# Patient Record
Sex: FEMALE | Race: WHITE | Employment: FULL TIME | URBAN - METROPOLITAN AREA
[De-identification: names, ages, dates, MRNs, and addresses within clinical notes are randomized per-mention and may not be internally consistent; named-entity substitution may affect disease eponyms.]

---

## 2016-01-23 LAB — T. PALLIDUM, EXTERNAL: NEGATIVE

## 2016-09-08 LAB
CHLAMYDIA, EXTERNAL: NEGATIVE
GRBS, EXTERNAL: NORMAL
HBSAG, EXTERNAL: NEGATIVE
HIV, EXTERNAL: NON REACTIVE
N. GONORRHEA, EXTERNAL: NEGATIVE
RUBELLA, EXTERNAL: NORMAL
TYPE, ABO & RH, EXTERNAL: NORMAL

## 2017-03-23 ENCOUNTER — HOSPITAL ENCOUNTER (INPATIENT)
Age: 32
LOS: 2 days | Discharge: HOME | End: 2017-03-25
Attending: OBSTETRICS & GYNECOLOGY | Admitting: OBSTETRICS & GYNECOLOGY
Payer: COMMERCIAL

## 2017-03-23 ENCOUNTER — ANESTHESIA (OUTPATIENT)
Dept: LABOR AND DELIVERY | Age: 32
End: 2017-03-23
Payer: COMMERCIAL

## 2017-03-23 ENCOUNTER — ANESTHESIA EVENT (OUTPATIENT)
Dept: LABOR AND DELIVERY | Age: 32
End: 2017-03-23
Payer: COMMERCIAL

## 2017-03-23 PROBLEM — Z37.9 NORMAL LABOR: Status: ACTIVE | Noted: 2017-03-23

## 2017-03-23 LAB
ERYTHROCYTE [DISTWIDTH] IN BLOOD BY AUTOMATED COUNT: 13.9 % (ref 11.5–14.5)
HCT VFR BLD AUTO: 37.8 % (ref 35–47)
HGB BLD-MCNC: 12.8 G/DL (ref 11.5–16)
MCH RBC QN AUTO: 30.5 PG (ref 26–34)
MCHC RBC AUTO-ENTMCNC: 33.9 G/DL (ref 30–36.5)
MCV RBC AUTO: 90.2 FL (ref 80–99)
PLATELET # BLD AUTO: 176 K/UL (ref 150–400)
RBC # BLD AUTO: 4.19 M/UL (ref 3.8–5.2)
WBC # BLD AUTO: 13.2 K/UL (ref 3.6–11)

## 2017-03-23 PROCEDURE — 74011000250 HC RX REV CODE- 250

## 2017-03-23 PROCEDURE — 74011250636 HC RX REV CODE- 250/636

## 2017-03-23 PROCEDURE — 75410000000 HC DELIVERY VAGINAL/SINGLE

## 2017-03-23 PROCEDURE — 74011250637 HC RX REV CODE- 250/637: Performed by: OBSTETRICS & GYNECOLOGY

## 2017-03-23 PROCEDURE — 74011000258 HC RX REV CODE- 258: Performed by: OBSTETRICS & GYNECOLOGY

## 2017-03-23 PROCEDURE — 75410000002 HC LABOR FEE PER 1 HR

## 2017-03-23 PROCEDURE — 77030011943

## 2017-03-23 PROCEDURE — A4300 CATH IMPL VASC ACCESS PORTAL: HCPCS

## 2017-03-23 PROCEDURE — 99282 EMERGENCY DEPT VISIT SF MDM: CPT

## 2017-03-23 PROCEDURE — 65410000002 HC RM PRIVATE OB

## 2017-03-23 PROCEDURE — 4A1HXCZ MONITORING OF PRODUCTS OF CONCEPTION, CARDIAC RATE, EXTERNAL APPROACH: ICD-10-PCS | Performed by: OBSTETRICS & GYNECOLOGY

## 2017-03-23 PROCEDURE — 77030031139 HC SUT VCRL2 J&J -A

## 2017-03-23 PROCEDURE — 85027 COMPLETE CBC AUTOMATED: CPT | Performed by: OBSTETRICS & GYNECOLOGY

## 2017-03-23 PROCEDURE — 74011250636 HC RX REV CODE- 250/636: Performed by: OBSTETRICS & GYNECOLOGY

## 2017-03-23 PROCEDURE — 0KQM0ZZ REPAIR PERINEUM MUSCLE, OPEN APPROACH: ICD-10-PCS | Performed by: OBSTETRICS & GYNECOLOGY

## 2017-03-23 PROCEDURE — 75410000003 HC RECOV DEL/VAG/CSECN EA 0.5 HR

## 2017-03-23 PROCEDURE — 76060000078 HC EPIDURAL ANESTHESIA

## 2017-03-23 PROCEDURE — 77030007880 HC KT SPN EPDRL BBMI -B

## 2017-03-23 PROCEDURE — 36415 COLL VENOUS BLD VENIPUNCTURE: CPT | Performed by: OBSTETRICS & GYNECOLOGY

## 2017-03-23 PROCEDURE — 00HU33Z INSERTION OF INFUSION DEVICE INTO SPINAL CANAL, PERCUTANEOUS APPROACH: ICD-10-PCS | Performed by: ANESTHESIOLOGY

## 2017-03-23 PROCEDURE — 74011250636 HC RX REV CODE- 250/636: Performed by: ANESTHESIOLOGY

## 2017-03-23 RX ORDER — HYDROCORTISONE ACETATE PRAMOXINE HCL 2.5; 1 G/100G; G/100G
CREAM TOPICAL AS NEEDED
Status: DISCONTINUED | OUTPATIENT
Start: 2017-03-23 | End: 2017-03-25 | Stop reason: HOSPADM

## 2017-03-23 RX ORDER — FENTANYL CITRATE 50 UG/ML
100 INJECTION, SOLUTION INTRAMUSCULAR; INTRAVENOUS ONCE
Status: DISCONTINUED | OUTPATIENT
Start: 2017-03-23 | End: 2017-03-23 | Stop reason: HOSPADM

## 2017-03-23 RX ORDER — SIMETHICONE 80 MG
80 TABLET,CHEWABLE ORAL
Status: DISCONTINUED | OUTPATIENT
Start: 2017-03-23 | End: 2017-03-25 | Stop reason: HOSPADM

## 2017-03-23 RX ORDER — DIPHENHYDRAMINE HCL 25 MG
25 CAPSULE ORAL
Status: DISCONTINUED | OUTPATIENT
Start: 2017-03-23 | End: 2017-03-25 | Stop reason: HOSPADM

## 2017-03-23 RX ORDER — ONDANSETRON 4 MG/1
4 TABLET, ORALLY DISINTEGRATING ORAL
Status: DISCONTINUED | OUTPATIENT
Start: 2017-03-23 | End: 2017-03-25 | Stop reason: HOSPADM

## 2017-03-23 RX ORDER — BUPIVACAINE HYDROCHLORIDE 5 MG/ML
INJECTION, SOLUTION EPIDURAL; INTRACAUDAL
Status: DISPENSED
Start: 2017-03-23 | End: 2017-03-23

## 2017-03-23 RX ORDER — LIDOCAINE HYDROCHLORIDE AND EPINEPHRINE 15; 5 MG/ML; UG/ML
INJECTION, SOLUTION EPIDURAL AS NEEDED
Status: DISCONTINUED | OUTPATIENT
Start: 2017-03-23 | End: 2017-03-23 | Stop reason: HOSPADM

## 2017-03-23 RX ORDER — SODIUM CHLORIDE, SODIUM LACTATE, POTASSIUM CHLORIDE, CALCIUM CHLORIDE 600; 310; 30; 20 MG/100ML; MG/100ML; MG/100ML; MG/100ML
125 INJECTION, SOLUTION INTRAVENOUS CONTINUOUS
Status: DISCONTINUED | OUTPATIENT
Start: 2017-03-23 | End: 2017-03-25 | Stop reason: HOSPADM

## 2017-03-23 RX ORDER — OXYTOCIN/RINGER'S LACTATE 20/1000 ML
125-1000 PLASTIC BAG, INJECTION (ML) INTRAVENOUS AS NEEDED
Status: DISCONTINUED | OUTPATIENT
Start: 2017-03-23 | End: 2017-03-25 | Stop reason: HOSPADM

## 2017-03-23 RX ORDER — BUPIVACAINE HYDROCHLORIDE 2.5 MG/ML
INJECTION, SOLUTION EPIDURAL; INFILTRATION; INTRACAUDAL AS NEEDED
Status: DISCONTINUED | OUTPATIENT
Start: 2017-03-23 | End: 2017-03-23 | Stop reason: HOSPADM

## 2017-03-23 RX ORDER — SODIUM CHLORIDE 0.9 % (FLUSH) 0.9 %
5-10 SYRINGE (ML) INJECTION EVERY 8 HOURS
Status: DISCONTINUED | OUTPATIENT
Start: 2017-03-23 | End: 2017-03-25 | Stop reason: HOSPADM

## 2017-03-23 RX ORDER — BUPIVACAINE HYDROCHLORIDE 5 MG/ML
30 INJECTION, SOLUTION EPIDURAL; INTRACAUDAL ONCE
Status: ACTIVE | OUTPATIENT
Start: 2017-03-23 | End: 2017-03-23

## 2017-03-23 RX ORDER — TERBUTALINE SULFATE 1 MG/ML
0.25 INJECTION SUBCUTANEOUS AS NEEDED
Status: DISCONTINUED | OUTPATIENT
Start: 2017-03-23 | End: 2017-03-23 | Stop reason: HOSPADM

## 2017-03-23 RX ORDER — BUPIVACAINE HYDROCHLORIDE 5 MG/ML
30 INJECTION, SOLUTION EPIDURAL; INTRACAUDAL AS NEEDED
Status: DISCONTINUED | OUTPATIENT
Start: 2017-03-23 | End: 2017-03-23 | Stop reason: HOSPADM

## 2017-03-23 RX ORDER — MINERAL OIL
OIL (ML) ORAL
Status: DISPENSED
Start: 2017-03-23 | End: 2017-03-23

## 2017-03-23 RX ORDER — SODIUM CHLORIDE 0.9 % (FLUSH) 0.9 %
5-10 SYRINGE (ML) INJECTION AS NEEDED
Status: DISCONTINUED | OUTPATIENT
Start: 2017-03-23 | End: 2017-03-25 | Stop reason: HOSPADM

## 2017-03-23 RX ORDER — AMMONIA 15 % (W/V)
1 AMPUL (EA) INHALATION AS NEEDED
Status: DISCONTINUED | OUTPATIENT
Start: 2017-03-23 | End: 2017-03-25 | Stop reason: HOSPADM

## 2017-03-23 RX ORDER — ZOLPIDEM TARTRATE 5 MG/1
5 TABLET ORAL
Status: DISCONTINUED | OUTPATIENT
Start: 2017-03-23 | End: 2017-03-25 | Stop reason: HOSPADM

## 2017-03-23 RX ORDER — FENTANYL CITRATE 50 UG/ML
100 INJECTION, SOLUTION INTRAMUSCULAR; INTRAVENOUS
Status: DISCONTINUED | OUTPATIENT
Start: 2017-03-23 | End: 2017-03-23 | Stop reason: HOSPADM

## 2017-03-23 RX ORDER — NALOXONE HYDROCHLORIDE 0.4 MG/ML
0.4 INJECTION, SOLUTION INTRAMUSCULAR; INTRAVENOUS; SUBCUTANEOUS AS NEEDED
Status: DISCONTINUED | OUTPATIENT
Start: 2017-03-23 | End: 2017-03-23 | Stop reason: HOSPADM

## 2017-03-23 RX ORDER — FENTANYL/BUPIVACAINE/NS/PF 2-1250MCG
PREFILLED PUMP RESERVOIR EPIDURAL
Status: COMPLETED
Start: 2017-03-23 | End: 2017-03-23

## 2017-03-23 RX ORDER — DOCUSATE SODIUM 100 MG/1
100 CAPSULE, LIQUID FILLED ORAL
Status: DISCONTINUED | OUTPATIENT
Start: 2017-03-23 | End: 2017-03-25 | Stop reason: HOSPADM

## 2017-03-23 RX ORDER — HYDROCORTISONE 1 %
CREAM (GRAM) TOPICAL AS NEEDED
Status: DISCONTINUED | OUTPATIENT
Start: 2017-03-23 | End: 2017-03-25 | Stop reason: HOSPADM

## 2017-03-23 RX ORDER — FENTANYL CITRATE 50 UG/ML
INJECTION, SOLUTION INTRAMUSCULAR; INTRAVENOUS
Status: DISPENSED
Start: 2017-03-23 | End: 2017-03-23

## 2017-03-23 RX ORDER — IBUPROFEN 400 MG/1
800 TABLET ORAL EVERY 8 HOURS
Status: DISCONTINUED | OUTPATIENT
Start: 2017-03-23 | End: 2017-03-25 | Stop reason: HOSPADM

## 2017-03-23 RX ORDER — OXYCODONE AND ACETAMINOPHEN 5; 325 MG/1; MG/1
1 TABLET ORAL
Status: DISCONTINUED | OUTPATIENT
Start: 2017-03-23 | End: 2017-03-25 | Stop reason: HOSPADM

## 2017-03-23 RX ORDER — FENTANYL CITRATE 50 UG/ML
INJECTION, SOLUTION INTRAMUSCULAR; INTRAVENOUS AS NEEDED
Status: DISCONTINUED | OUTPATIENT
Start: 2017-03-23 | End: 2017-03-23 | Stop reason: HOSPADM

## 2017-03-23 RX ORDER — OXYTOCIN IN 5 % DEXTROSE 30/500 ML
PLASTIC BAG, INJECTION (ML) INTRAVENOUS
Status: COMPLETED
Start: 2017-03-23 | End: 2017-03-23

## 2017-03-23 RX ORDER — OXYCODONE AND ACETAMINOPHEN 5; 325 MG/1; MG/1
2 TABLET ORAL
Status: DISCONTINUED | OUTPATIENT
Start: 2017-03-23 | End: 2017-03-25 | Stop reason: HOSPADM

## 2017-03-23 RX ORDER — NALBUPHINE HYDROCHLORIDE 10 MG/ML
10 INJECTION, SOLUTION INTRAMUSCULAR; INTRAVENOUS; SUBCUTANEOUS
Status: DISCONTINUED | OUTPATIENT
Start: 2017-03-23 | End: 2017-03-23 | Stop reason: HOSPADM

## 2017-03-23 RX ORDER — FENTANYL/BUPIVACAINE/NS/PF 2-1250MCG
1-16 PREFILLED PUMP RESERVOIR EPIDURAL CONTINUOUS
Status: DISCONTINUED | OUTPATIENT
Start: 2017-03-23 | End: 2017-03-23 | Stop reason: HOSPADM

## 2017-03-23 RX ADMIN — Medication 30000 MILLI-UNITS: at 04:44

## 2017-03-23 RX ADMIN — IBUPROFEN 800 MG: 400 TABLET ORAL at 22:58

## 2017-03-23 RX ADMIN — IBUPROFEN 800 MG: 400 TABLET ORAL at 15:08

## 2017-03-23 RX ADMIN — BUPIVACAINE HYDROCHLORIDE 2 ML: 2.5 INJECTION, SOLUTION EPIDURAL; INFILTRATION; INTRACAUDAL at 02:02

## 2017-03-23 RX ADMIN — SODIUM CHLORIDE, SODIUM LACTATE, POTASSIUM CHLORIDE, AND CALCIUM CHLORIDE 1000 ML: 600; 310; 30; 20 INJECTION, SOLUTION INTRAVENOUS at 01:40

## 2017-03-23 RX ADMIN — SODIUM CHLORIDE, SODIUM LACTATE, POTASSIUM CHLORIDE, AND CALCIUM CHLORIDE 125 ML/HR: 600; 310; 30; 20 INJECTION, SOLUTION INTRAVENOUS at 03:33

## 2017-03-23 RX ADMIN — FENTANYL CITRATE 100 MCG: 50 INJECTION, SOLUTION INTRAMUSCULAR; INTRAVENOUS at 01:59

## 2017-03-23 RX ADMIN — Medication 10 ML/HR: at 02:17

## 2017-03-23 RX ADMIN — AMPICILLIN SODIUM 2 G: 2 INJECTION, POWDER, FOR SOLUTION INTRAMUSCULAR; INTRAVENOUS at 02:10

## 2017-03-23 RX ADMIN — FENTANYL 0.2 MG/100ML-BUPIV 0.125%-NACL 0.9% EPIDURAL INJ 10 ML/HR: 2/0.125 SOLUTION at 02:17

## 2017-03-23 RX ADMIN — LIDOCAINE HYDROCHLORIDE AND EPINEPHRINE 3 ML: 15; 5 INJECTION, SOLUTION EPIDURAL at 01:58

## 2017-03-23 RX ADMIN — IBUPROFEN 800 MG: 400 TABLET ORAL at 07:01

## 2017-03-23 RX ADMIN — BUPIVACAINE HYDROCHLORIDE 4 ML: 2.5 INJECTION, SOLUTION EPIDURAL; INFILTRATION; INTRACAUDAL at 02:00

## 2017-03-23 NOTE — IP AVS SNAPSHOT
Current Discharge Medication List  
  
START taking these medications Dose & Instructions Dispensing Information Comments Morning Noon Evening Bedtime  
 docusate sodium 100 mg capsule Commonly known as:  [place] Harjeet Your last dose was: Your next dose is:    
   
   
 Dose:  100 mg Take 1 Cap by mouth two (2) times daily as needed for Constipation. Quantity:  60 Cap Refills:  0  
     
   
   
   
  
 ibuprofen 600 mg tablet Commonly known as:  MOTRIN Your last dose was: Your next dose is:    
   
   
 Dose:  600 mg Take 1 Tab by mouth every six (6) hours as needed for Pain. Quantity:  40 Tab Refills:  0  
     
   
   
   
  
 oxyCODONE-acetaminophen 5-325 mg per tablet Commonly known as:  PERCOCET Your last dose was: Your next dose is:    
   
   
 Dose:  1 Tab Take 1 Tab by mouth every four (4) hours as needed for Pain. Max Daily Amount: 6 Tabs. Quantity:  28 Tab Refills:  0 CONTINUE these medications which have NOT CHANGED Dose & Instructions Dispensing Information Comments Morning Noon Evening Bedtime  
 albuterol 90 mcg/actuation inhaler Commonly known as:  PROVENTIL HFA, VENTOLIN HFA, PROAIR HFA Your last dose was: Your next dose is:    
   
   
 Dose:  2 Puff Take 2 Puffs by inhalation every four (4) hours as needed for Wheezing. Quantity:  1 Inhaler Refills:  3 PRENATAL DHA+COMPLETE PRENATAL -300 mg-mcg-mg Cmpk Generic drug:  PNV no.24-iron-folic acid-dha Your last dose was: Your next dose is: Take  by mouth. Refills:  0 STOP taking these medications   
 amoxicillin 500 mg capsule Commonly known as:  AMOXIL HYDROcodone-acetaminophen 5-325 mg per tablet Commonly known as:  Alexandra Roy Iron 160 mg (50 mg iron) Tber tablet Generic drug:  ferrous sulfate ER  
   
 OCELLA 3-0.03 mg Tab Generic drug:  drospirenone-ethinyl estradiol ASK your doctor about these medications Dose & Instructions Dispensing Information Comments Morning Noon Evening Bedtime  
 predniSONE 10 mg tablet Commonly known as:  Radha Russell Your last dose was: Your next dose is:    
   
   
 Dose:  10 mg Take 1 Tab by mouth two (2) times a day. Quantity:  10 Tab Refills:  0 Where to Get Your Medications Information on where to get these meds will be given to you by the nurse or doctor. ! Ask your nurse or doctor about these medications  
  docusate sodium 100 mg capsule  
 ibuprofen 600 mg tablet  
 oxyCODONE-acetaminophen 5-325 mg per tablet

## 2017-03-23 NOTE — ROUTINE PROCESS
TRANSFER - IN REPORT:    Verbal report received from ELZA Pizarro(name) on Maame Lawrence  being received from L&D(unit) for routine progression of care      Report consisted of patients Situation, Background, Assessment and   Recommendations(SBAR). Information from the following report(s) SBAR, Procedure Summary, Intake/Output and Recent Results was reviewed with the receiving nurse. Opportunity for questions and clarification was provided. Assessment completed upon patients arrival to unit and care assumed.      5901-0178 hourly round completed

## 2017-03-23 NOTE — PROGRESS NOTES
This patient was 30 or more weeks gestation at the time of [facility] go[facility]. For complete information pertaining to this patient's pregnancy, please refer to the paper chart and ACOG form. Delivery Note    Obstetrician:  Wanda Clay MD    Assistant: none    Pre-Delivery Diagnosis:PROM : Term pregnancy or Spontaneous labor    Post-Delivery Diagnosis: Living  infant(s) or Male    Intrapartum Event: None    Procedure: Spontaneous vaginal delivery    Epidural: YES    Monitor:  Fetal Heart Tones - External and Uterine Contractions - External    Indications for instrumental delivery: none    Estimated Blood Loss:  350 cc    Episiotomy: none    Laceration(s):  2nd degree    and Bilateral periurethral     Laceration(s) repair: YES    Presentation: Cephalic    Fetal Description: oliveira    Fetal Position: Occiput Anterior    Birth Weight:     Birth Length:     Apgar - One Minute: 8    Apgar - Five Minutes: 9    Umbilical Cord: 3 vessels present    Specimens:            Complications:  none           Cord Blood Results:   Information for the patient's :  Brad Peguero [[id number]]   No results found for: PCTABR, PCTDIG, BILI, ABORH    Prenatal Labs:     Lab Results   Component Value Date/Time    ABO,Rh O Positive 2016    HBsAg, External Negative 2016    HIV, External Non Reactive 2016    Rubella, External 12.10 Immune 2016    Gonorrhea, External Negative 2016    Chlamydia, External Negative 2016    GrBStrep, External Positive - Urine 2016        Attending Attestation: I performed the procedure    Signed By:  Wanda Clay MD     2017

## 2017-03-23 NOTE — H&P
Labor and Delivery Admission Note  3/23/2017    27 y.o., , female, G1 P 0 Estimated Date of Delivery: 4/16/17 by dates and US presents with leakage of fluid and contractions at 0110  Reports good fetal movement, no bleeding, and has strong  contractions. Mentions having big gush of fluid leaking from the vagina around 11.30 pm and has been ana paula since midnight. Was 3 cm in the office on Monday    PNC: Blood type:             RH: pos            Rubella:             SVII serology: neg             GBS status: positive  Past Medical History:   Diagnosis Date    Asthma     Bronchitis     Fatigue     Knee pain, right     Thyroid nodule      No past surgical history on file.   OB/GYN: Dr. Jose Enrique Lin:   Current Facility-Administered Medications   Medication Dose Route Frequency    lactated ringers bolus infusion 500-1,000 mL  500-1,000 mL IntraVENous PRN    terbutaline (BRETHINE) injection 0.25 mg  0.25 mg SubCUTAneous PRN    nalbuphine (NUBAIN) injection 10 mg  10 mg IntraVENous Q2H PRN    fentaNYL citrate (PF) injection 100 mcg  100 mcg IntraVENous Q4H PRN    ampicillin (OMNIPEN) 2 g in 0.9% sodium chloride (MBP/ADV) 50 mL  2 g IntraVENous ONCE    ampicillin (OMNIPEN) 1 g in 0.9% sodium chloride (MBP/ADV) 50 mL  1 g IntraVENous Q4H    lactated ringers bolus infusion 1,000 mL  1,000 mL IntraVENous ONCE    lactated ringers bolus infusion 1,000 mL  1,000 mL IntraVENous ONCE    bupivacaine (PF) (MARCAINE) 0.5 % (5 mg/mL) injection 150 mg  30 mL Epidural PRN    fentaNYL citrate (PF) injection 100 mcg  100 mcg Epidural ONCE    fentaNYL 2mcg/mL - bupivacaine 0.125% pf epidural  1-16 mL/hr Epidural CONTINUOUS    naloxone (NARCAN) injection 0.4 mg  0.4 mg IntraVENous PRN    ePHEDrine (MISTOLE) 50 mg/mL injection 10 mg  10 mg IntraVENous Q5MIN PRN    bupivacaine (PF) (MARCAINE) 0.5 % (5 mg/mL) injection 150 mg  30 mL Epidural ONCE    fentaNYL citrate (PF) injection 100 mcg  100 mcg Epidural ONCE    bupivacaine (PF) (MARCAINE) 0.5 % (5 mg/mL) injection        fentaNYL citrate (PF) 50 mcg/mL injection        ePHEDrine (MISTOLE) 50 mg/mL injection         Allergies: No Known Allergies  Pertinent ROS: per HPI   Family History   Problem Relation Age of Onset    Thyroid Disease Mother     Hypertension Father      Social History     Social History    Marital status:      Spouse name: N/A    Number of children: N/A    Years of education: N/A     Occupational History    Not on file. Social History Main Topics    Smoking status: Never Smoker    Smokeless tobacco: Never Used    Alcohol use Yes      Comment: in moderation    Drug use: Not on file    Sexual activity: Not on file     Other Topics Concern    Not on file     Social History Narrative       OBJECTIVE:  Gravid , female NAD  No data recorded. Visit Vitals    Ht 5' 3\" (1.6 m)    Wt 83.5 kg (184 lb)    BMI 32.59 kg/m2       Labs:    Lab Results   Component Value Date/Time    WBC 13.2 2017 01:46 AM       Exam:  HEENT:  normal   Lungs:  clear  Cor:  RRR  Abdomen:  Fundal height 37 cm                    Soft between UC                    Clinical EFW  Fetal heart rate tracin,moderate variability,+accels  Contraction pattern: every 1-2 mins  Cervix:  7cm/90/-1 on admission  Fluid:  clear  Pelvimetry:  AP-good                      Arch- adequate                      Sidewalls- adequate                      Pelvis feels adequate for fetus.     Impression:  IUP at 36 weeks 4 days with PROM in Active Labor  GBS positive- Ampicillin IV  Epidural per patient request    Mirela Peters MD         S/P Epidural. Has had some rectal pressure  Cervix c/c/0  Labor down    Dr. Edgard Meraz

## 2017-03-23 NOTE — ANESTHESIA PROCEDURE NOTES
Epidural Block    Start time: 3/23/2017 1:51 AM  End time: 3/23/2017 2:02 AM  Performed by: Josette Sun  Authorized by: Josette Sun     Pre-Procedure  Indication: labor epidural    Preanesthetic Checklist: patient identified, risks and benefits discussed, anesthesia consent, patient being monitored, timeout performed and anesthesia consent    Timeout Time: 01:51        Epidural:   Patient position:  Left lateral decubitus  Prep region:  Lumbar  Prep: Chlorhexidine    Location:  L2-3    Needle and Epidural Catheter:   Needle Type:  Tuohy  Needle Gauge:  17 G  Injection Technique:  Loss of resistance using air  Attempts:  1  Catheter Size:  19 G  Events: no blood with aspiration, no cerebrospinal fluid with aspiration, no paresthesia and negative aspiration test    Test Dose:  Bupivacaine 0.25% and negative    Assessment:   Catheter Secured:  Tegaderm and tape  Insertion:  Uncomplicated  Patient tolerance:  Patient tolerated the procedure well with no immediate complications

## 2017-03-23 NOTE — PROGRESS NOTES
G 1 P 0 rec'd to LR # 14 @ 36.4 weeks gest to the s/o Dr Sapphire Wren with c/o LOF and ctxs since 2330. NKDA. States ctxs became very strong around 0030. States ROMs at 2330, clear fluid. Denies complic of pregnancy. +FM. GBS positive in urine from 2016, pt states she will need antibiotics. Hurting a lot and requesting an epidural now. Dr Gilberto Pereyra on-call. 0116 EFMs placed, pt unable to hold still through ctxs. Needing to get up to bathroom with each ctx.  0124 SVE: 6-7/90/-1. Verbal consent for admission / IV.  0133 c/o rectal pressure, will move to LR # 11.  0140 IV started in RLA with #20 IV catheter. LR bolus hung with pressure bag for epid placement. 5 Dr Lisa Guido present, T/O completed. 1622 Epidural placed. 0210 Feeling strong rectal pressure. 6101 Dr Gilberto Pereyra at bedside talking with patient. SVE: 10/100/0. To labor down. 2834 Started to push. 0425 Dr Gilberto Pereyra at the bedside, legs up in stirrups. 3832  of a LMI over a second degree laceration. 0700 TRANSFER - OUT REPORT:    Verbal report given to A Salima RN (name) on Mihir Pierce  being transferred to [facility], room 320 (unit) via wheelchair for routine progression of care       Report consisted of patients Situation, Background, Assessment and   Recommendations(SBAR). Information from the following report(s) SBAR, Intake/Output, MAR and Recent Results was reviewed with the receiving nurse. Lines:   Peripheral IV 17 Right; Lower Arm (Active)   Site Assessment Clean, dry, & intact 3/23/2017  2:26 AM   Phlebitis Assessment 0 3/23/2017  2:26 AM   Infiltration Assessment 0 3/23/2017  2:26 AM   Dressing Status Clean, dry, & intact 3/23/2017  2:26 AM   Dressing Type Tape;Transparent 3/23/2017  2:26 AM   Hub Color/Line Status Pink; Infusing;Patent 3/23/2017  2:26 AM        Opportunity for questions and clarification was provided.       Patient transported with:   Registered Nurse

## 2017-03-23 NOTE — LACTATION NOTE
This note was copied from a baby's chart. Late  infant was able to wake and feed at this time. His blood sugar level was 44 prior to feed. Infant placed skin to skin, and encouraged to latch in laid back, biologic position. Infant responded well to natural positioning, where innate feeding instincts are active. Baby was able to root and latch with only minimal assistance from mother. Baby fed well for 30 minutes, both breasts were offered. Reviewed effects/risks of late  birth on initiation of breastfeeding including infant's sleepiness, ineffective or missed breastfeedings, infant's decreased stamina to sustain prolonged latch and effective breastfeeding, decreased energy reserves related to low birth wt and inability to stimulate milk supply. Recommended interventions include skin to skin bonding at breast, hand expression of colostrum as infant rests at breast and initiation of breastfeeding as infant is able, initiation of pumping regimen following feedings, all EBM to be fed to baby; complement/supplement feeding if medically indicated and ordered by pediatrician. Pump set up and regimen initiated. Mother educated on use of pump, handling of milk, and cleaning.

## 2017-03-23 NOTE — LACTATION NOTE
This note was copied from a baby's chart. Made follow up lactation visit. Infant was showing feeding cues so mother responded by breastfeeding him. He took a few minutes to latch on but did eventually. He sucks rhythmically in short sucking burst and rests and begins again. Mom is good at keeping him stimulated to suck. I told her if she is having to prod him to stay awake do not let feeding go over 25 minutes. It is okay to let him go past 25 minutes if he is nursing under his own power. Mom is pumping after feedings and getting a few gtts that she is rubbing in his mouth. Mother denies further questions for me today.

## 2017-03-23 NOTE — ROUTINE PROCESS
Bedside/verbal SBAR received from Patti Sena RN.    0391-6727 hourly rounds completed    0225-5399 hourly rounds completed    2763-9906 hourly rounds completed

## 2017-03-23 NOTE — ROUTINE PROCESS
OB SBAR received from Partha Escalante RN    [id number]: hourly rounds complete  5364-6782: hourly rounds complete  5142-3139: hourly rounds complete

## 2017-03-23 NOTE — IP AVS SNAPSHOT
[address] [facility] [address] 
[phone] Patient: Denae Lozada MRN: [id number] AEU:3/0/3113 You are allergic to the following No active allergies Immunizations Administered for This Admission Name Date MMR  Deferred () Recent Documentation Height Weight Breastfeeding? BMI OB Status Smoking Status 1.6 m 83.5 kg Unknown 32.59 kg/m2 Recent pregnancy Never Smoker Unresulted Labs Order Current Status SAMPLE TO BLOOD BANK In process Emergency Contacts Name Discharge Info Relation Home Work Mobile Monroe Randle  Other Relative [6]   [phone] Phillip Gonzalez  Spouse [3]   [phone] About your hospitalization You were admitted on:  March 23, 2017 You last received care in the:  [address] [facility] You were discharged on:  March 25, 2017 Unit phone number:  [phone] Why you were hospitalized Your primary diagnosis was:  Not on File Your diagnoses also included:  Normal Labor Providers Seen During Your Hospitalizations Provider Role Specialty Primary office phone Arturo Reis MD Attending Provider Obstetrics & Gynecology [phone] Your Primary Care Physician (PCP) Primary Care Physician Office Phone Office Fax Tanisha Mock [phone] Follow-up Information Follow up With Details Comments Contact Info [facility] Call As needed for breastfeeding questions or concerns. [address], [address] 
[phone] Thuan Jhaveri MD   [address] [address] [address] 
[phone] Arturo Reis MD In 6 weeks Postpartum [address] [facility],[address] 
[phone] Current Discharge Medication List  
  
START taking these medications Dose & Instructions Dispensing Information Comments Morning Noon Evening Bedtime  
 docusate sodium 100 mg capsule Commonly known as:  Edwin Kt Your last dose was: Your next dose is:    
   
   
 Dose:  100 mg Take 1 Cap by mouth two (2) times daily as needed for Constipation. Quantity:  60 Cap Refills:  0  
     
   
   
   
  
 ibuprofen 600 mg tablet Commonly known as:  MOTRIN Your last dose was: Your next dose is:    
   
   
 Dose:  600 mg Take 1 Tab by mouth every six (6) hours as needed for Pain. Quantity:  40 Tab Refills:  0  
     
   
   
   
  
 oxyCODONE-acetaminophen 5-325 mg per tablet Commonly known as:  PERCOCET Your last dose was: Your next dose is:    
   
   
 Dose:  1 Tab Take 1 Tab by mouth every four (4) hours as needed for Pain. Max Daily Amount: 6 Tabs. Quantity:  28 Tab Refills:  0 CONTINUE these medications which have NOT CHANGED Dose & Instructions Dispensing Information Comments Morning Noon Evening Bedtime  
 albuterol 90 mcg/actuation inhaler Commonly known as:  PROVENTIL HFA, VENTOLIN HFA, PROAIR HFA Your last dose was: Your next dose is:    
   
   
 Dose:  2 Puff Take 2 Puffs by inhalation every four (4) hours as needed for Wheezing. Quantity:  1 Inhaler Refills:  3 PRENATAL DHA+COMPLETE PRENATAL -300 mg-mcg-mg Cmpk Generic drug:  PNV no.24-iron-folic acid-dha Your last dose was: Your next dose is: Take  by mouth. Refills:  0 STOP taking these medications   
 amoxicillin 500 mg capsule Commonly known as:  AMOXIL HYDROcodone-acetaminophen 5-325 mg per tablet Commonly known as:  Javy Ngozi Iron 160 mg (50 mg iron) Tber tablet Generic drug:  ferrous sulfate ER  
   
  
 OCELLA 3-0.03 mg Tab Generic drug:  drospirenone-ethinyl estradiol ASK your doctor about these medications Dose & Instructions Dispensing Information Comments Morning Noon Evening Bedtime  
 predniSONE 10 mg tablet Commonly known as:  Maeve Edmundks Your last dose was: Your next dose is:    
   
   
 Dose:  10 mg Take 1 Tab by mouth two (2) times a day. Quantity:  10 Tab Refills:  0 Where to Get Your Medications Information on where to get these meds will be given to you by the nurse or doctor. ! Ask your nurse or doctor about these medications  
  docusate sodium 100 mg capsule  
 ibuprofen 600 mg tablet  
 oxyCODONE-acetaminophen 5-325 mg per tablet Discharge Instructions Vaginal Childbirth: Care Instructions Your Care Instructions Your body will slowly heal in the next few weeks. It is easy to get too tired and overwhelmed during the first weeks after your baby is born. Changes in your hormones can shift your mood without warning. You may find it hard to meet the extra demands on your energy and time. Take it easy on yourself. Follow-up care is a key part of your treatment and safety. Be sure to make and go to all appointments, and call your doctor if you are having problems. It's also a good idea to know your test results and keep a list of the medicines you take. How can you care for yourself at home? · Vaginal bleeding and cramps ¨ After delivery, you will have a bloody discharge from the vagina. This will turn pink within a week and then white or yellow after about 10 days. It may last for 2 to 4 weeks or longer, until the uterus has healed. Use pads instead of tampons until you stop bleeding. ¨ Do not worry if you pass some blood clots, as long as they are smaller than a golf ball. If you have a tear or stitches in your vaginal area, change the pad at least every 4 hours to prevent soreness and infection. ¨ You may have cramps for the first few days after childbirth.  These are normal and occur as the uterus shrinks to normal size. Take an over-the-counter pain medicine, such as acetaminophen (Tylenol), ibuprofen (Advil, Motrin), or naproxen (Aleve), for cramps. Read and follow all instructions on the label. Do not take aspirin, because it can cause more bleeding. ¨ Do not take two or more pain medicines at the same time unless the doctor told you to. Many pain medicines have acetaminophen, which is Tylenol. Too much acetaminophen (Tylenol) can be harmful. · Stitches ¨ If you have stitches, they will dissolve on their own and do not need to be removed. Follow your doctor's instructions for cleaning the stitched area. ¨ Put ice or a cold pack on your painful area for 10 to 20 minutes at a time, several times a day, for the first few days. Put a thin cloth between the ice and your skin. ¨ Sit in a few inches of warm water (sitz bath) 3 times a day and after bowel movements. The warm water helps with pain and itching. If you do not have a tub, a warm shower might help. · Breast fullness ¨ Your breasts may overfill (engorge) in the first few days after delivery. To help milk flow and to relieve pain, warm your breasts in the shower or by using warm, moist towels before nursing. ¨ If you are not nursing, do not put warmth on your breasts or touch your breasts. Wear a tight bra or sports bra and use ice until the fullness goes away. This usually takes 2 to 3 days. ¨ Put ice or a cold pack on your breast after nursing to reduce swelling and pain. Put a thin cloth between the ice and your skin. · Activity ¨ Eat a balanced diet. Do not try to lose weight by cutting calories. Keep taking your prenatal vitamins, or take a multivitamin. ¨ Get as much rest as you can. Try to take naps when your baby sleeps during the day. ¨ Get some exercise every day. But do not do any heavy exercise until your doctor says it is okay. ¨ Wait until you are healed (about 4 to 6 weeks) before you have sexual intercourse. Your doctor will tell you when it is okay to have sex. ¨ Talk to your doctor about birth control. You can get pregnant even before your period returns. Also, you can get pregnant while you are breastfeeding. · Mental health ¨ It is normal to have some sadness, anxiety, sleeplessness, and mood swings after you go home. If you feel upset or hopeless for more than a few days or are having trouble doing the things you need to do, talk to your doctor. · Constipation and hemorrhoids ¨ Drink plenty of fluids, enough so that your urine is light yellow or clear like water. If you have kidney, heart, or liver disease and have to limit fluids, talk with your doctor before you increase the amount of fluids you drink. ¨ Eat plenty of fiber each day. Have a bran muffin or bran cereal for breakfast, and try eating a piece of fruit for a mid-afternoon snack. ¨ For painful, itchy hemorrhoids, put ice or a cold pack on the area several times a day for 10 minutes at a time. Follow this by putting a warm compress on the area for another 10 to 20 minutes or by sitting in a shallow, warm bath. When should you call for help? Call 911 anytime you think you may need emergency care. For example, call if: 
· You are thinking of hurting yourself, your baby, or anyone else. · You have sudden, severe pain in your belly. · You passed out (lost consciousness). Call your doctor now or seek immediate medical care if: 
· You have severe vaginal bleeding. · You are soaking through a pad each hour for 2 or more hours. · Your vaginal bleeding seems to be getting heavier or is still bright red 4 days after delivery. · You are dizzy or lightheaded, or you feel like you may faint. · You are vomiting or cannot keep fluids down. · You have a fever. · You have new or more belly pain. · You pass tissue (not just blood). · Your vaginal discharge smells bad. · Your belly feels tender or full and hard. · Your breasts are continuously painful or red. · You feel sad, anxious, or hopeless for more than a few days. Watch closely for changes in your health, and be sure to contact your doctor if you have any problems. Where can you learn more? Go to http://jorge-alex.info/. Enter U109 in the search box to learn more about \"Vaginal Childbirth: Care Instructions. \" Current as of: May 30, 2016 Content Version: 11.1 © 6110-1891 [employer]. Care instructions adapted under license by [employer] (which disclaims liability or warranty for this information). If you have questions about a medical condition or this instruction, always ask your healthcare professional. [address] any warranty or liability for your use of this information. Discharge Orders None Outski Announcement We are excited to announce that we are making your provider's discharge notes available to you in Outski. You will see these notes when they are completed and signed by the physician that discharged you from your recent hospital stay. If you have any questions or concerns about any information you see in Outski, please call the Health Information Department where you were seen or reach out to your Primary Care Provider for more information about your plan of care. Introducing [facility]! [employer] introduces Outski patient portal. Now you can access parts of your medical record, email your doctor's office, and request medication refills online. 1. In your internet browser, go to [email or web address] 2. Click on the First Time User? Click Here link in the Sign In box. You will see the New Member Sign Up page. 3. Enter your Outski Access Code exactly as it appears below. You will not need to use this code after youve completed the sign-up process.  If you do not sign up before the expiration date, you must request a new code. · Synthorx Access Code: [email or web address] Expires: 6/23/2017 10:27 AM 
 
4. Enter the last four digits of your Social Security Number (xxxx) and Date of Birth (mm/dd/yyyy) as indicated and click Submit. You will be taken to the next sign-up page. 5. Create a Synthorx ID. This will be your Synthorx login ID and cannot be changed, so think of one that is secure and easy to remember. 6. Create a Synthorx password. You can change your password at any time. 7. Enter your Password Reset Question and Answer. This can be used at a later time if you forget your password. 8. Enter your e-mail address. You will receive e-mail notification when new information is available in [address] 9. Click Sign Up. You can now view and download portions of your medical record. 10. Click the Download Summary menu link to download a portable copy of your medical information. If you have questions, please visit the Frequently Asked Questions section of the Synthorx website. Remember, Synthorx is NOT to be used for urgent needs. For medical emergencies, dial 911. Now available from your iPhone and Android! General Information Please provide this summary of care documentation to your next provider. Patient Signature:  ____________________________________________________________ Date:  ____________________________________________________________  
  
Héctormisha Viera Provider Signature:  ____________________________________________________________ Date:  ____________________________________________________________

## 2017-03-24 PROCEDURE — 74011250637 HC RX REV CODE- 250/637: Performed by: OBSTETRICS & GYNECOLOGY

## 2017-03-24 PROCEDURE — 65410000002 HC RM PRIVATE OB

## 2017-03-24 RX ADMIN — IBUPROFEN 800 MG: 400 TABLET ORAL at 22:58

## 2017-03-24 RX ADMIN — IBUPROFEN 800 MG: 400 TABLET ORAL at 15:00

## 2017-03-24 RX ADMIN — DOCUSATE SODIUM 100 MG: 100 CAPSULE, LIQUID FILLED ORAL at 16:39

## 2017-03-24 RX ADMIN — IBUPROFEN 800 MG: 400 TABLET ORAL at 07:00

## 2017-03-24 NOTE — PROGRESS NOTES
Post-Partum Day Number 1 Progress Note    Fred Gonzalez     Assessment: Doing well, post partum day 1, afebrile    Plan:  1. Continue routine postpartum and perineal care as well as maternal education. 2. N/A     Information for the patient's :  Mauricio Metcalf [[id number]]   Vaginal, Spontaneous Delivery   Patient doing well without significant complaint. Voiding without difficulty, normal lochia. Vitals:  Visit Vitals    /70 (BP 1 Location: Right arm, BP Patient Position: At rest)    Pulse 83    Temp 98.3 °F (36.8 °C)    Resp 16    Ht 5' 3\" (1.6 m)    Wt 83.5 kg (184 lb)    SpO2 98%    Breastfeeding Unknown    BMI 32.59 kg/m2     Temp (24hrs), Av.1 °F (36.7 °C), Min:98 °F (36.7 °C), Max:98.3 °F (36.8 °C)        Exam:   Patient without distress. Abdomen soft, fundus firm, nontender                Perineum with normal lochia noted. Lower extremities are negative for swelling, cords or tenderness. Labs:     Lab Results   Component Value Date/Time    WBC 13.2 2017 01:46 AM    WBC 7.4 2012 11:40 AM    HGB 12.8 2017 01:46 AM    HGB 11.7 2012 11:40 AM    HCT 37.8 2017 01:46 AM    HCT 35.4 2012 11:40 AM    PLATELET 975 [id number] 01:46 AM    PLATELET 093  11:40 AM       No results found for this or any previous visit (from the past 24 hour(s)).

## 2017-03-24 NOTE — LACTATION NOTE
This note was copied from a baby's chart. I did not see the baby at the breast. Mom states the baby is latching well. At the last feeding her nursed for 55 minutes. Baby was born late  so mom has been pumping after nursing. She is getting drops of colostrum and she knows to give the baby any colostrum she pumps. Reviewed effects/risks of late  delivery on initiation of breast feeding including infant's sleepiness, ineffective or missed breast feedings, infant's decreased stamina to sustain prolonged latch and effective breast feeding, decreased energy reserves related to low birth weight and inability to stimulate milk supply.  Recommended interventions include skin to skin, feeding on cues and pumping as needed to ensure adequate milk supply.

## 2017-03-24 NOTE — ROUTINE PROCESS
Bedside/verbal SBAR received from Thad Goodman RN.    [id number] hourly rounds completed    [id number] hourly rounds completed    [id number] hourly rounds completed

## 2017-03-25 VITALS
DIASTOLIC BLOOD PRESSURE: 80 MMHG | HEIGHT: 63 IN | TEMPERATURE: 98.3 F | SYSTOLIC BLOOD PRESSURE: 125 MMHG | HEART RATE: 88 BPM | RESPIRATION RATE: 16 BRPM | WEIGHT: 184 LBS | OXYGEN SATURATION: 98 % | BODY MASS INDEX: 32.6 KG/M2

## 2017-03-25 PROCEDURE — 74011250637 HC RX REV CODE- 250/637: Performed by: OBSTETRICS & GYNECOLOGY

## 2017-03-25 RX ORDER — IBUPROFEN 600 MG/1
600 TABLET ORAL
Qty: 40 TAB | Refills: 0 | Status: SHIPPED | OUTPATIENT
Start: 2017-03-25 | End: 2018-08-23

## 2017-03-25 RX ORDER — OXYCODONE AND ACETAMINOPHEN 5; 325 MG/1; MG/1
1 TABLET ORAL
Qty: 28 TAB | Refills: 0 | Status: SHIPPED | OUTPATIENT
Start: 2017-03-25 | End: 2018-08-23

## 2017-03-25 RX ORDER — DOCUSATE SODIUM 100 MG/1
100 CAPSULE, LIQUID FILLED ORAL
Qty: 60 CAP | Refills: 0 | Status: SHIPPED | OUTPATIENT
Start: 2017-03-25 | End: 2018-08-23

## 2017-03-25 RX ADMIN — IBUPROFEN 800 MG: 400 TABLET ORAL at 08:00

## 2017-03-25 NOTE — DISCHARGE SUMMARY
Obstetrical Discharge Summary     Name: Tess Donald MRN: [id number]  SSN: [id number]    YOB: 1985  Age: 27 y.o. Sex: female      Admit Date: 3/23/2017    Discharge Date: 3/25/2017     Admitting Physician: Abigail Walker MD     Attending Physician:  Rolf Flaherty MD     Admission Diagnoses: maternity  Normal labor    Discharge Diagnoses:   Information for the patient's :  Don Sahu [[id number]]   Delivery of a 2.815 kg male infant via Vaginal, Spontaneous Delivery on 3/23/2017 at 4:38 AM  by . Apgars were 8 and 8. Additional Diagnoses:   Hospital Problems  Date Reviewed: 2016          Codes Class Noted POA    Normal labor ICD-10-CM: O80, Z37.9  ICD-9-CM: 108  3/23/2017 Unknown             Lab Results   Component Value Date/Time    Rubella, External 12.10 Immune 2016    GrBStrep, External Positive - Urine 2016       Hospital Course: Normal hospital course following the delivery. Disposition at Discharge: Home or self care    Discharged Condition: Stable    Patient Instructions:   Current Discharge Medication List      START taking these medications    Details   ibuprofen (MOTRIN) 600 mg tablet Take 1 Tab by mouth every six (6) hours as needed for Pain. Qty: 40 Tab, Refills: 0      oxyCODONE-acetaminophen (PERCOCET) 5-325 mg per tablet Take 1 Tab by mouth every four (4) hours as needed for Pain. Max Daily Amount: 6 Tabs. Qty: 28 Tab, Refills: 0      docusate sodium (COLACE) 100 mg capsule Take 1 Cap by mouth two (2) times daily as needed for Constipation. Qty: 60 Cap, Refills: 0         CONTINUE these medications which have NOT CHANGED    Details   PNV no.24-iron-folic acid-dha (PRENATAL DHA+COMPLETE PRENATAL) -300 mg-mcg-mg cmpk Take  by mouth. albuterol (PROVENTIL HFA, VENTOLIN HFA, PROAIR HFA) 90 mcg/actuation inhaler Take 2 Puffs by inhalation every four (4) hours as needed for Wheezing.   Qty: 1 Inhaler, Refills: 3         STOP taking these medications       ferrous sulfate ER (IRON) 160 mg (50 mg iron) TbER tablet Comments:   Reason for Stopping:         amoxicillin (AMOXIL) 500 mg capsule Comments:   Reason for Stopping:         predniSONE (DELTASONE) 10 mg tablet Comments:   Reason for Stopping:         HYDROcodone-acetaminophen (NORCO) 5-325 mg per tablet Comments:   Reason for Stopping:         drospirenone-ethinyl estradiol (OCELLA) 3-0.03 mg per tablet Comments:   Reason for Stopping:               Reference my discharge instructions.     Follow-up Appointments   Procedures    FOLLOW UP VISIT Appointment in: 6 Weeks     Standing Status:   Standing     Number of Occurrences:   1     Order Specific Question:   Appointment in     Answer:   6 Weeks        Signed By:  King Cotto MD     March 25, 2017

## 2017-03-25 NOTE — DISCHARGE INSTRUCTIONS
Vaginal Childbirth: Care Instructions  Your Care Instructions  Your body will slowly heal in the next few weeks. It is easy to get too tired and overwhelmed during the first weeks after your baby is born. Changes in your hormones can shift your mood without warning. You may find it hard to meet the extra demands on your energy and time. Take it easy on yourself. Follow-up care is a key part of your treatment and safety. Be sure to make and go to all appointments, and call your doctor if you are having problems. It's also a good idea to know your test results and keep a list of the medicines you take. How can you care for yourself at home? · Vaginal bleeding and cramps  ¨ After delivery, you will have a bloody discharge from the vagina. This will turn pink within a week and then white or yellow after about 10 days. It may last for 2 to 4 weeks or longer, until the uterus has healed. Use pads instead of tampons until you stop bleeding. ¨ Do not worry if you pass some blood clots, as long as they are smaller than a golf ball. If you have a tear or stitches in your vaginal area, change the pad at least every 4 hours to prevent soreness and infection. ¨ You may have cramps for the first few days after childbirth. These are normal and occur as the uterus shrinks to normal size. Take an over-the-counter pain medicine, such as acetaminophen (Tylenol), ibuprofen (Advil, Motrin), or naproxen (Aleve), for cramps. Read and follow all instructions on the label. Do not take aspirin, because it can cause more bleeding. ¨ Do not take two or more pain medicines at the same time unless the doctor told you to. Many pain medicines have acetaminophen, which is Tylenol. Too much acetaminophen (Tylenol) can be harmful. · Stitches  ¨ If you have stitches, they will dissolve on their own and do not need to be removed. Follow your doctor's instructions for cleaning the stitched area.   ¨ Put ice or a cold pack on your painful area for 10 to 20 minutes at a time, several times a day, for the first few days. Put a thin cloth between the ice and your skin. ¨ Sit in a few inches of warm water (sitz bath) 3 times a day and after bowel movements. The warm water helps with pain and itching. If you do not have a tub, a warm shower might help. · Breast fullness  ¨ Your breasts may overfill (engorge) in the first few days after delivery. To help milk flow and to relieve pain, warm your breasts in the shower or by using warm, moist towels before nursing. ¨ If you are not nursing, do not put warmth on your breasts or touch your breasts. Wear a tight bra or sports bra and use ice until the fullness goes away. This usually takes 2 to 3 days. ¨ Put ice or a cold pack on your breast after nursing to reduce swelling and pain. Put a thin cloth between the ice and your skin. · Activity  ¨ Eat a balanced diet. Do not try to lose weight by cutting calories. Keep taking your prenatal vitamins, or take a multivitamin. ¨ Get as much rest as you can. Try to take naps when your baby sleeps during the day. ¨ Get some exercise every day. But do not do any heavy exercise until your doctor says it is okay. ¨ Wait until you are healed (about 4 to 6 weeks) before you have sexual intercourse. Your doctor will tell you when it is okay to have sex. ¨ Talk to your doctor about birth control. You can get pregnant even before your period returns. Also, you can get pregnant while you are breastfeeding. · Mental health  ¨ It is normal to have some sadness, anxiety, sleeplessness, and mood swings after you go home. If you feel upset or hopeless for more than a few days or are having trouble doing the things you need to do, talk to your doctor. · Constipation and hemorrhoids  ¨ Drink plenty of fluids, enough so that your urine is light yellow or clear like water.  If you have kidney, heart, or liver disease and have to limit fluids, talk with your doctor before you increase the amount of fluids you drink. ¨ Eat plenty of fiber each day. Have a bran muffin or bran cereal for breakfast, and try eating a piece of fruit for a mid-afternoon snack. ¨ For painful, itchy hemorrhoids, put ice or a cold pack on the area several times a day for 10 minutes at a time. Follow this by putting a warm compress on the area for another 10 to 20 minutes or by sitting in a shallow, warm bath. When should you call for help? Call 911 anytime you think you may need emergency care. For example, call if:  · You are thinking of hurting yourself, your baby, or anyone else. · You have sudden, severe pain in your belly. · You passed out (lost consciousness). Call your doctor now or seek immediate medical care if:  · You have severe vaginal bleeding. · You are soaking through a pad each hour for 2 or more hours. · Your vaginal bleeding seems to be getting heavier or is still bright red 4 days after delivery. · You are dizzy or lightheaded, or you feel like you may faint. · You are vomiting or cannot keep fluids down. · You have a fever. · You have new or more belly pain. · You pass tissue (not just blood). · Your vaginal discharge smells bad. · Your belly feels tender or full and hard. · Your breasts are continuously painful or red. · You feel sad, anxious, or hopeless for more than a few days. Watch closely for changes in your health, and be sure to contact your doctor if you have any problems. Where can you learn more? Go to http://jorge-alex.info/. Enter M999 in the search box to learn more about \"Vaginal Childbirth: Care Instructions. \"  Current as of: May 30, 2016  Content Version: 11.1  © 9864-4999 [employer]. Care instructions adapted under license by [employer] (which disclaims liability or warranty for this information).  If you have questions about a medical condition or this instruction, always ask your healthcare professional. [employer] disclaims any warranty or liability for your use of this information.

## 2017-03-25 NOTE — LACTATION NOTE
This note was copied from a baby's chart. Mom and baby scheduled for discharge this am. I did not see the baby at the breast. Mom states baby is nursing well and has improved throughout post partum stay, deep latch maintained, mother is comfortable, milk is in transition, baby feeding vigorously with rhythmic suck, swallow, breathe pattern, with audible swallowing, and evident milk transfer, both breasts offered, baby is asleep following feeding. Baby is feeding on demand, voiding and stools present as appropriate over the last 24 hours. Mom has been pumping after nursing and offering the EBM to the baby because baby was born at 42 weeks. Reviewed cluster feeding. The brief behavioral phase preceeding milk transition is called cluster feeding. Typical  behavior: baby becomes vigorous at the breast and wants to feed frequently- every 1-2 hours for several feedings. Emptying of the breast twice produces double in subsequent feedings. This is the normal process by which the baby demands his/her supply. This type of frequent feeding is the stimulation which causes lactogenesis II (milk coming in). Discussed engorgement. Breasts may become engorged when milk \"comes in\". How milk is made / normal phases of milk production, supply and demand discussed. Taught care of engorged breasts - frequent breastfeeding encouraged, warm compresses and breast massage ac. Then nurse the baby or pump. Apply cold compresses pc x 15 minutes a few times a day for swelling or discomfort. May need to do this care for a couple of days. Teaching completed and all questions answered. Feeding log updated and given to mom.

## 2017-03-25 NOTE — ROUTINE PROCESS
0730: OB SBAR report received by Lord Destini PAPPAS.   8526: Discharge instructions reviewed with pt. Prescriptions provided. All questions answered. Follow up with Dr. Mike Fields in 6 weeks. Pt discharged in wheelchair by volunteers. Hourly rounds completed [id number].

## 2017-03-25 NOTE — PROGRESS NOTES
Post-Partum Day Number 2 Progress Note    Fred Gonzalez     Assessment: Doing well, post partum day 2    Plan:   1. Discharge home today  2. Follow up in office in 6 weeks with Mary Grijalva MD  3. Post partum activity advised, diet as tolerated  4. Discharge Medications: ibuprofen, percocet and medications prior to admission    Information for the patient's :  Jam Walls [[id number]]   Vaginal, Spontaneous Delivery   Patient doing well without significant complaint. Voiding without difficulty, normal lochia. Vitals:  Visit Vitals    /80 (BP 1 Location: Right arm, BP Patient Position: At rest;Sitting)    Pulse 88    Temp 98.3 °F (36.8 °C)    Resp 16    Ht 5' 3\" (1.6 m)    Wt 83.5 kg (184 lb)    SpO2 98%    Breastfeeding Unknown    BMI 32.59 kg/m2     Temp (24hrs), Av.3 °F (36.8 °C), Min:97.8 °F (36.6 °C), Max:98.5 °F (36.9 °C)      Exam:         Patient without distress. Abdomen soft, fundus firm, nontender                 Lower extremities are negative for swelling, cords or tenderness. Labs:     Lab Results   Component Value Date/Time    WBC 13.2 2017 01:46 AM    WBC 7.4 2012 11:40 AM    HGB 12.8 2017 01:46 AM    HGB 11.7 2012 11:40 AM    HCT 37.8 2017 01:46 AM    HCT 35.4 2012 11:40 AM    PLATELET 203  01:46 AM    PLATELET 307  11:40 AM       No results found for this or any previous visit (from the past 24 hour(s)).

## 2017-03-25 NOTE — PROGRESS NOTES
Post-Partum Day Number 1 Progress Note    Fred Gonzalez     Assessment: Doing well, post partum day 1    Plan:  1. Continue routine postpartum and perineal care as well as maternal education. 2. The risks and benefits of the circumcision  procedure and anesthesia including: bleeding, infection, variability of cosmetic results were discussed at length with the mother. She is aware that future repeat procedures may be necessary. She gives informed consent to proceed as noted and her questions are answered. 3. Discharge in AM    Information for the patient's :  Brad Peguero [[id number]]   Vaginal, Spontaneous Delivery   Patient doing well without significant complaint. Voiding without difficulty, normal lochia. Vitals:  Visit Vitals    /81 (BP 1 Location: Left arm, BP Patient Position: Post activity)    Pulse 85    Temp 98.4 °F (36.9 °C)    Resp 16    Ht 5' 3\" (1.6 m)    Wt 83.5 kg (184 lb)    SpO2 98%    Breastfeeding Unknown    BMI 32.59 kg/m2     Temp (24hrs), Av.4 °F (36.9 °C), Min:98.3 °F (36.8 °C), Max:98.5 °F (36.9 °C)        Exam:   Patient without distress. Abdomen soft, fundus firm, nontender                Perineum with normal lochia noted. Lower extremities are negative for swelling, cords or tenderness. Labs:     Lab Results   Component Value Date/Time    WBC 13.2 2017 01:46 AM    WBC 7.4 2012 11:40 AM    HGB 12.8 2017 01:46 AM    HGB 11.7 2012 11:40 AM    HCT 37.8 2017 01:46 AM    HCT 35.4 2012 11:40 AM    PLATELET 318  01:46 AM    PLATELET 691  11:40 AM       No results found for this or any previous visit (from the past 24 hour(s)).

## 2017-03-25 NOTE — ROUTINE PROCESS
Bedside and Verbal shift change report given to CORTEZ Kumari RN (oncoming nurse) by Cecile Amezcua RN (offgoing nurse). Report included the following information SBAR, Kardex, Procedure Summary, Intake/Output, MAR and Med Rec Status. 9204-9813: Hourly rounds completed. 0218-1152: Hourly rounds completed. 9807-0471: Hourly rounds completed.

## 2017-08-02 PROBLEM — E05.90 HYPERTHYROIDISM: Status: ACTIVE | Noted: 2017-08-02

## 2017-08-09 ENCOUNTER — HOSPITAL ENCOUNTER (OUTPATIENT)
Dept: ULTRASOUND IMAGING | Age: 32
Discharge: HOME | End: 2017-08-09
Attending: INTERNAL MEDICINE
Payer: COMMERCIAL

## 2017-08-09 DIAGNOSIS — E04.1 THYROID NODULE: ICD-10-CM

## 2017-08-09 PROCEDURE — 76536 US EXAM OF HEAD AND NECK: CPT

## 2018-08-29 ENCOUNTER — HOSPITAL ENCOUNTER (OUTPATIENT)
Dept: ULTRASOUND IMAGING | Age: 33
Discharge: HOME | End: 2018-08-29
Attending: INTERNAL MEDICINE
Payer: COMMERCIAL

## 2018-08-29 DIAGNOSIS — E04.1 THYROID NODULE: ICD-10-CM

## 2018-08-29 PROCEDURE — 76536 US EXAM OF HEAD AND NECK: CPT

## 2019-08-27 ENCOUNTER — HOSPITAL ENCOUNTER (OUTPATIENT)
Dept: ULTRASOUND IMAGING | Age: 34
Discharge: HOME | End: 2019-08-27
Attending: INTERNAL MEDICINE
Payer: COMMERCIAL

## 2019-08-27 DIAGNOSIS — E04.1 THYROID NODULE: ICD-10-CM

## 2019-08-27 PROCEDURE — 76536 US EXAM OF HEAD AND NECK: CPT

## 2020-12-08 ENCOUNTER — OFFICE VISIT (OUTPATIENT)
Dept: URGENT CARE | Age: 35
End: 2020-12-08
Payer: COMMERCIAL

## 2020-12-08 VITALS — OXYGEN SATURATION: 98 % | TEMPERATURE: 100.8 F | HEART RATE: 108 BPM | RESPIRATION RATE: 16 BRPM

## 2020-12-08 DIAGNOSIS — L03.119 CELLULITIS OF AXILLARY REGION: Primary | ICD-10-CM

## 2020-12-08 PROCEDURE — 99203 OFFICE O/P NEW LOW 30 MIN: CPT | Performed by: NURSE PRACTITIONER

## 2020-12-08 RX ORDER — CLINDAMYCIN HYDROCHLORIDE 300 MG/1
300 CAPSULE ORAL 3 TIMES DAILY
Qty: 21 CAP | Refills: 0 | Status: SHIPPED | OUTPATIENT
Start: 2020-12-08 | End: 2020-12-15

## 2020-12-08 NOTE — PROGRESS NOTES
Patient presents to drive up respiratory clinic during covid-19 pandemic and was evaluated in her vehicle. Here for evaluation of painful rash under both arms. States shaved arms 1 night ago and noted some immediate irritation. Following morning seemed worse. Initially burning sensation now more painful. This afternoon she reports subjective fever. Denies any chest pain, dizziness, n/v, headache, chest pain, breast changes, sore throat or exposures/bites. She has a virtual visit with [facility] tomorrow as one of her friends was concerned it may be related to her breast. No personal history of breast CA. No noted masses or breast changes. ROS negative for rigors  PMH significant for hyperthyroidism+ thyroid nodule.   No covid exposures                 Past Medical History:   Diagnosis Date    Asthma     Asthmatic Bronchitis,     Bronchitis     Fatigue     Hyperthyroidism 8/2/2017    Knee pain, right     Thyroid nodule 2012    Nodule outside of thyroid, being watched        Past Surgical History:   Procedure Laterality Date    HX OTHER SURGICAL  2012    R knee surgery    HX OTHER SURGICAL  2010    Ovarian cyst         Family History   Problem Relation Age of Onset    Thyroid Disease Mother     Hypertension Father     No Known Problems Brother     No Known Problems Brother         Social History     Socioeconomic History    Marital status:      Spouse name: Not on file    Number of children: Not on file    Years of education: Not on file    Highest education level: Not on file   Occupational History    Not on file   Social Needs    Financial resource strain: Not on file    Food insecurity     Worry: Not on file     Inability: Not on file    Transportation needs     Medical: Not on file     Non-medical: Not on file   Tobacco Use    Smoking status: Never Smoker    Smokeless tobacco: Never Used   Substance and Sexual Activity    Alcohol use: No    Drug use: No    Sexual activity: Yes Partners: Male     Birth control/protection: None   Lifestyle    Physical activity     Days per week: Not on file     Minutes per session: Not on file    Stress: Not on file   Relationships    Social connections     Talks on phone: Not on file     Gets together: Not on file     Attends Holiness service: Not on file     Active member of club or organization: Not on file     Attends meetings of clubs or organizations: Not on file     Relationship status: Not on file    Intimate partner violence     Fear of current or ex partner: Not on file     Emotionally abused: Not on file     Physically abused: Not on file     Forced sexual activity: Not on file   Other Topics Concern    Not on file   Social History Narrative    Not on file                ALLERGIES: Patient has no known allergies. Review of Systems   Gastrointestinal: Negative for vomiting. Musculoskeletal: Negative for arthralgias, neck pain and neck stiffness. Skin: Positive for rash. All other systems reviewed and are negative. Vitals:    12/08/20 1700 12/08/20 1724   Pulse: (!) 118 (!) 108   Resp: 18 16   Temp: (!) 100.8 °F (38.2 °C)    SpO2: 98%        Physical Exam  Constitutional:       Appearance: Normal appearance. Comments: Pleasant mood. HENT:      Nose: No congestion or rhinorrhea. Mouth/Throat:      Mouth: Mucous membranes are moist.      Pharynx: No oropharyngeal exudate. Eyes:      Extraocular Movements: Extraocular movements intact. Conjunctiva/sclera: Conjunctivae normal.      Pupils: Pupils are equal, round, and reactive to light. Neck:      Musculoskeletal: No neck rigidity. Comments: No supraclavicular or epitroclear LAD. No noted lymphadenopathy of axilla. Cardiovascular:      Rate and Rhythm: Normal rate and regular rhythm. Pulses: Normal pulses. Heart sounds: Normal heart sounds. Pulmonary:      Effort: Pulmonary effort is normal. No respiratory distress.       Breath sounds: Normal breath sounds. No stridor. No wheezing, rhonchi or rales. Lymphadenopathy:      Cervical: No cervical adenopathy. Skin:     Capillary Refill: Capillary refill takes less than 2 seconds. Neurological:      Mental Status: She is alert and oriented to person, place, and time. Psychiatric:         Mood and Affect: Mood normal.         Behavior: Behavior normal.         MDM     Differential Diagnosis; Clinical Impression; Plan:     CLINICAL IMPRESSION:  (L03.119) Cellulitis of axillary region  (primary encounter diagnosis)    Orders Placed This Encounter      clindamycin (CLEOCIN) 300 mg capsule          Sig: Take 1 Cap by mouth three (3) times daily for 7 days. Dispense:  21 Cap          Refill:  0      Plan:  Start clindamycin for suspected cellulitis. ddx developing abscess. [place] area with pen at home to note swelling   ED for fevers not lowered by tylenol, dizziness, n/v/d, near synope or SOB or rapidly spreading rash. Otherwise keep virtual visit for tomorrow with OBGYN. We have reviewed concerning signs/symptoms, normal vs abnormal progression of medical condition and when to seek immediate medical attention. Schedule with PCP or Urgent Care immediately for worsening or new symptoms. See your PCP if there is not at least some improvement in symptoms within the next 48 hours  You should see your PCP for updates on your routine health maintenance.              Procedures

## 2022-01-25 LAB
ANTIBODY SCREEN, EXTERNAL: NEGATIVE
HBSAG, EXTERNAL: NEGATIVE
HIV, EXTERNAL: NON REACTIVE
RUBELLA, EXTERNAL: 11.5
T. PALLIDUM, EXTERNAL: NON REACTIVE

## 2022-03-19 PROBLEM — E05.90 HYPERTHYROIDISM: Status: ACTIVE | Noted: 2017-08-02

## 2022-03-19 PROBLEM — Z37.9 NORMAL LABOR: Status: ACTIVE | Noted: 2017-03-23

## 2022-08-02 ENCOUNTER — HOSPITAL ENCOUNTER (INPATIENT)
Age: 37
LOS: 3 days | Discharge: HOME | End: 2022-08-05
Attending: OBSTETRICS & GYNECOLOGY | Admitting: OBSTETRICS & GYNECOLOGY
Payer: COMMERCIAL

## 2022-08-02 PROBLEM — O14.90 PRE-ECLAMPSIA: Status: ACTIVE | Noted: 2022-08-02

## 2022-08-02 PROBLEM — O36.5990 IUGR (INTRAUTERINE GROWTH RESTRICTION) AFFECTING CARE OF MOTHER: Status: ACTIVE | Noted: 2022-08-02

## 2022-08-02 LAB
ALBUMIN SERPL-MCNC: 2.3 G/DL (ref 3.5–5)
ALBUMIN/GLOB SERPL: 0.7 {RATIO} (ref 1.1–2.2)
ALP SERPL-CCNC: 129 U/L (ref 45–117)
ALT SERPL-CCNC: 19 U/L (ref 12–78)
ANION GAP SERPL CALC-SCNC: 8 MMOL/L (ref 5–15)
AST SERPL-CCNC: 15 U/L (ref 15–37)
BILIRUB SERPL-MCNC: 0.2 MG/DL (ref 0.2–1)
BUN SERPL-MCNC: 20 MG/DL (ref 6–20)
BUN/CREAT SERPL: 31 (ref 12–20)
CALCIUM SERPL-MCNC: 9.1 MG/DL (ref 8.5–10.1)
CHLORIDE SERPL-SCNC: 104 MMOL/L (ref 97–108)
CO2 SERPL-SCNC: 23 MMOL/L (ref 21–32)
CREAT SERPL-MCNC: 0.65 MG/DL (ref 0.55–1.02)
CREAT UR-MCNC: 52 MG/DL
ERYTHROCYTE [DISTWIDTH] IN BLOOD BY AUTOMATED COUNT: 14.6 % (ref 11.5–14.5)
GLOBULIN SER CALC-MCNC: 3.4 G/DL (ref 2–4)
GLUCOSE SERPL-MCNC: 85 MG/DL (ref 65–100)
HCT VFR BLD AUTO: 36.3 % (ref 35–47)
HGB BLD-MCNC: 12.2 G/DL (ref 11.5–16)
LDH SERPL L TO P-CCNC: 224 U/L (ref 81–246)
MCH RBC QN AUTO: 29.3 PG (ref 26–34)
MCHC RBC AUTO-ENTMCNC: 33.6 G/DL (ref 30–36.5)
MCV RBC AUTO: 87.1 FL (ref 80–99)
NRBC # BLD: 0 K/UL (ref 0–0.01)
NRBC BLD-RTO: 0 PER 100 WBC
PLATELET # BLD AUTO: 177 K/UL (ref 150–400)
PMV BLD AUTO: 12 FL (ref 8.9–12.9)
POTASSIUM SERPL-SCNC: 4.1 MMOL/L (ref 3.5–5.1)
PROT SERPL-MCNC: 5.7 G/DL (ref 6.4–8.2)
PROT UR-MCNC: 148 MG/DL (ref 0–11.9)
PROT/CREAT UR-RTO: 2.8
RBC # BLD AUTO: 4.17 M/UL (ref 3.8–5.2)
SODIUM SERPL-SCNC: 135 MMOL/L (ref 136–145)
URATE SERPL-MCNC: 4.5 MG/DL (ref 2.6–6)
WBC # BLD AUTO: 7.5 K/UL (ref 3.6–11)

## 2022-08-02 PROCEDURE — 87081 CULTURE SCREEN ONLY: CPT

## 2022-08-02 PROCEDURE — 83615 LACTATE (LD) (LDH) ENZYME: CPT

## 2022-08-02 PROCEDURE — 65270000029 HC RM PRIVATE

## 2022-08-02 PROCEDURE — 74011000250 HC RX REV CODE- 250: Performed by: OBSTETRICS & GYNECOLOGY

## 2022-08-02 PROCEDURE — 74011250637 HC RX REV CODE- 250/637: Performed by: OBSTETRICS & GYNECOLOGY

## 2022-08-02 PROCEDURE — 3E033VJ INTRODUCTION OF OTHER HORMONE INTO PERIPHERAL VEIN, PERCUTANEOUS APPROACH: ICD-10-PCS | Performed by: OBSTETRICS & GYNECOLOGY

## 2022-08-02 PROCEDURE — 80053 COMPREHEN METABOLIC PANEL: CPT

## 2022-08-02 PROCEDURE — 74011000258 HC RX REV CODE- 258: Performed by: OBSTETRICS & GYNECOLOGY

## 2022-08-02 PROCEDURE — 84156 ASSAY OF PROTEIN URINE: CPT

## 2022-08-02 PROCEDURE — 84550 ASSAY OF BLOOD/URIC ACID: CPT

## 2022-08-02 PROCEDURE — G0378 HOSPITAL OBSERVATION PER HR: HCPCS

## 2022-08-02 PROCEDURE — 36415 COLL VENOUS BLD VENIPUNCTURE: CPT

## 2022-08-02 PROCEDURE — 74011250636 HC RX REV CODE- 250/636: Performed by: OBSTETRICS & GYNECOLOGY

## 2022-08-02 PROCEDURE — 85027 COMPLETE CBC AUTOMATED: CPT

## 2022-08-02 PROCEDURE — 74011000250 HC RX REV CODE- 250

## 2022-08-02 RX ORDER — BETAMETHASONE SODIUM PHOSPHATE AND BETAMETHASONE ACETATE 3; 3 MG/ML; MG/ML
12 INJECTION, SUSPENSION INTRA-ARTICULAR; INTRALESIONAL; INTRAMUSCULAR; SOFT TISSUE ONCE
Status: COMPLETED | OUTPATIENT
Start: 2022-08-03 | End: 2022-08-03

## 2022-08-02 RX ORDER — LABETALOL HYDROCHLORIDE 5 MG/ML
20 INJECTION, SOLUTION INTRAVENOUS
Status: COMPLETED | OUTPATIENT
Start: 2022-08-02 | End: 2022-08-02

## 2022-08-02 RX ORDER — OXYTOCIN/RINGER'S LACTATE 30/500 ML
87.3 PLASTIC BAG, INJECTION (ML) INTRAVENOUS AS NEEDED
Status: DISCONTINUED | OUTPATIENT
Start: 2022-08-02 | End: 2022-08-05 | Stop reason: HOSPADM

## 2022-08-02 RX ORDER — SODIUM CHLORIDE 0.9 % (FLUSH) 0.9 %
5-40 SYRINGE (ML) INJECTION EVERY 8 HOURS
Status: DISCONTINUED | OUTPATIENT
Start: 2022-08-02 | End: 2022-08-05 | Stop reason: HOSPADM

## 2022-08-02 RX ORDER — SODIUM CHLORIDE, SODIUM LACTATE, POTASSIUM CHLORIDE, CALCIUM CHLORIDE 600; 310; 30; 20 MG/100ML; MG/100ML; MG/100ML; MG/100ML
125 INJECTION, SOLUTION INTRAVENOUS CONTINUOUS
Status: DISCONTINUED | OUTPATIENT
Start: 2022-08-02 | End: 2022-08-05 | Stop reason: HOSPADM

## 2022-08-02 RX ORDER — MAGNESIUM SULFATE HEPTAHYDRATE 40 MG/ML
4 INJECTION, SOLUTION INTRAVENOUS ONCE
Status: COMPLETED | OUTPATIENT
Start: 2022-08-02 | End: 2022-08-02

## 2022-08-02 RX ORDER — NALOXONE HYDROCHLORIDE 0.4 MG/ML
0.4 INJECTION, SOLUTION INTRAMUSCULAR; INTRAVENOUS; SUBCUTANEOUS AS NEEDED
Status: DISCONTINUED | OUTPATIENT
Start: 2022-08-02 | End: 2022-08-03 | Stop reason: HOSPADM

## 2022-08-02 RX ORDER — OXYTOCIN/RINGER'S LACTATE 30/500 ML
10 PLASTIC BAG, INJECTION (ML) INTRAVENOUS AS NEEDED
Status: COMPLETED | OUTPATIENT
Start: 2022-08-02 | End: 2022-08-03

## 2022-08-02 RX ORDER — BETAMETHASONE SODIUM PHOSPHATE AND BETAMETHASONE ACETATE 3; 3 MG/ML; MG/ML
12 INJECTION, SUSPENSION INTRA-ARTICULAR; INTRALESIONAL; INTRAMUSCULAR; SOFT TISSUE ONCE
Status: COMPLETED | OUTPATIENT
Start: 2022-08-02 | End: 2022-08-02

## 2022-08-02 RX ORDER — ONDANSETRON 2 MG/ML
4 INJECTION INTRAMUSCULAR; INTRAVENOUS
Status: DISCONTINUED | OUTPATIENT
Start: 2022-08-02 | End: 2022-08-03 | Stop reason: HOSPADM

## 2022-08-02 RX ORDER — BETAMETHASONE SODIUM PHOSPHATE AND BETAMETHASONE ACETATE 3; 3 MG/ML; MG/ML
12 INJECTION, SUSPENSION INTRA-ARTICULAR; INTRALESIONAL; INTRAMUSCULAR; SOFT TISSUE ONCE
Status: CANCELLED | OUTPATIENT
Start: 2022-08-02 | End: 2022-08-02

## 2022-08-02 RX ORDER — LABETALOL HYDROCHLORIDE 5 MG/ML
INJECTION, SOLUTION INTRAVENOUS
Status: COMPLETED
Start: 2022-08-02 | End: 2022-08-02

## 2022-08-02 RX ORDER — LABETALOL 200 MG/1
200 TABLET, FILM COATED ORAL EVERY 12 HOURS
Status: DISCONTINUED | OUTPATIENT
Start: 2022-08-02 | End: 2022-08-05 | Stop reason: HOSPADM

## 2022-08-02 RX ORDER — OXYTOCIN/RINGER'S LACTATE 30/500 ML
1-25 PLASTIC BAG, INJECTION (ML) INTRAVENOUS
Status: DISCONTINUED | OUTPATIENT
Start: 2022-08-02 | End: 2022-08-03 | Stop reason: HOSPADM

## 2022-08-02 RX ORDER — NALBUPHINE HYDROCHLORIDE 20 MG/ML
5 INJECTION, SOLUTION INTRAMUSCULAR; INTRAVENOUS; SUBCUTANEOUS
Status: DISCONTINUED | OUTPATIENT
Start: 2022-08-02 | End: 2022-08-03 | Stop reason: HOSPADM

## 2022-08-02 RX ORDER — SODIUM CHLORIDE 0.9 % (FLUSH) 0.9 %
5-40 SYRINGE (ML) INJECTION AS NEEDED
Status: DISCONTINUED | OUTPATIENT
Start: 2022-08-02 | End: 2022-08-03

## 2022-08-02 RX ORDER — LABETALOL HYDROCHLORIDE 5 MG/ML
40 INJECTION, SOLUTION INTRAVENOUS
Status: COMPLETED | OUTPATIENT
Start: 2022-08-02 | End: 2022-08-02

## 2022-08-02 RX ADMIN — SODIUM CHLORIDE 5 MILLION UNITS: 900 INJECTION INTRAVENOUS at 21:21

## 2022-08-02 RX ADMIN — LABETALOL HYDROCHLORIDE 20 MG: 5 INJECTION, SOLUTION INTRAVENOUS at 14:49

## 2022-08-02 RX ADMIN — LABETALOL HYDROCHLORIDE 200 MG: 200 TABLET, FILM COATED ORAL at 17:43

## 2022-08-02 RX ADMIN — OXYTOCIN 1 MILLI-UNITS/MIN: 10 INJECTION, SOLUTION INTRAMUSCULAR; INTRAVENOUS at 22:04

## 2022-08-02 RX ADMIN — MAGNESIUM SULFATE HEPTAHYDRATE 4 G: 40 INJECTION, SOLUTION INTRAVENOUS at 21:18

## 2022-08-02 RX ADMIN — SODIUM CHLORIDE, POTASSIUM CHLORIDE, SODIUM LACTATE AND CALCIUM CHLORIDE 125 ML/HR: 600; 310; 30; 20 INJECTION, SOLUTION INTRAVENOUS at 14:45

## 2022-08-02 RX ADMIN — MAGNESIUM SULFATE HEPTAHYDRATE 2 G/HR: 500 INJECTION, SOLUTION INTRAMUSCULAR; INTRAVENOUS at 21:38

## 2022-08-02 RX ADMIN — LABETALOL HYDROCHLORIDE 40 MG: 5 INJECTION, SOLUTION INTRAVENOUS at 15:02

## 2022-08-02 RX ADMIN — BETAMETHASONE SODIUM PHOSPHATE AND BETAMETHASONE ACETATE 12 MG: 3; 3 INJECTION, SUSPENSION INTRA-ARTICULAR; INTRALESIONAL; INTRAMUSCULAR at 15:42

## 2022-08-02 NOTE — PROGRESS NOTES
8/2/2022 12:43 PM Received client to LDR#8 from [address] [place] [address] office for evaluation of elevated BPs. Client states that she has had some elevated pressure for the last 2 weeks and the office has been monitoring it but today it was 180s/100s. Client states that she was scheduled for an induction on this upcoming Monday. 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 Client denies any contractions or leakage of fluid of pain/discomfort. GBS sample obtained in office will send down to lab. 1435-Dr. Bethany Pérez informed of recent BPs, orders given to start labetalol protocol and administer steroids. 1502-CLient unaware of contractions at this time  1630-Client in bed working on her laptop, no distress noted   2116-Magnesium Sulfate started per MD orders. Infusion explained to  and client and opportunity for questions given. All questions answered. 2135-Dr. Malik at bedside talking with client and  and opportunity for questions given regarding POC for the night. All questions answered and client verbalized understanding. 2200-Pitocin started   2340-Bedside and Verbal shift change report given to ELZA Peter RN (oncoming nurse) by MADAN NEGRON  (offgoing nurse). Report included the following information SBAR, Kardex, MAR, Accordion, and Recent Results.

## 2022-08-03 ENCOUNTER — ANESTHESIA EVENT (OUTPATIENT)
Dept: LABOR AND DELIVERY | Age: 37
End: 2022-08-03
Payer: COMMERCIAL

## 2022-08-03 ENCOUNTER — ANESTHESIA (OUTPATIENT)
Dept: LABOR AND DELIVERY | Age: 37
End: 2022-08-03
Payer: COMMERCIAL

## 2022-08-03 PROCEDURE — 75410000003 HC RECOV DEL/VAG/CSECN EA 0.5 HR

## 2022-08-03 PROCEDURE — 74011250637 HC RX REV CODE- 250/637: Performed by: OBSTETRICS & GYNECOLOGY

## 2022-08-03 PROCEDURE — 65410000002 HC RM PRIVATE OB

## 2022-08-03 PROCEDURE — 75410000002 HC LABOR FEE PER 1 HR

## 2022-08-03 PROCEDURE — 74011250636 HC RX REV CODE- 250/636: Performed by: OBSTETRICS & GYNECOLOGY

## 2022-08-03 PROCEDURE — 75410000000 HC DELIVERY VAGINAL/SINGLE

## 2022-08-03 PROCEDURE — 74011000250 HC RX REV CODE- 250: Performed by: ANESTHESIOLOGY

## 2022-08-03 PROCEDURE — 76060000078 HC EPIDURAL ANESTHESIA

## 2022-08-03 PROCEDURE — 00HU33Z INSERTION OF INFUSION DEVICE INTO SPINAL CANAL, PERCUTANEOUS APPROACH: ICD-10-PCS | Performed by: ANESTHESIOLOGY

## 2022-08-03 PROCEDURE — 74011000258 HC RX REV CODE- 258: Performed by: OBSTETRICS & GYNECOLOGY

## 2022-08-03 PROCEDURE — 77030014125 HC TY EPDRL BBMI -B: Performed by: ANESTHESIOLOGY

## 2022-08-03 PROCEDURE — 88307 TISSUE EXAM BY PATHOLOGIST: CPT

## 2022-08-03 PROCEDURE — 74011250636 HC RX REV CODE- 250/636: Performed by: ANESTHESIOLOGY

## 2022-08-03 RX ORDER — ACETAMINOPHEN 325 MG/1
650 TABLET ORAL
Status: DISCONTINUED | OUTPATIENT
Start: 2022-08-03 | End: 2022-08-03 | Stop reason: SDUPTHER

## 2022-08-03 RX ORDER — HYDROCORTISONE 1 %
CREAM (GRAM) TOPICAL AS NEEDED
Status: DISCONTINUED | OUTPATIENT
Start: 2022-08-03 | End: 2022-08-05 | Stop reason: HOSPADM

## 2022-08-03 RX ORDER — SODIUM CHLORIDE 0.9 % (FLUSH) 0.9 %
5-40 SYRINGE (ML) INJECTION AS NEEDED
Status: DISCONTINUED | OUTPATIENT
Start: 2022-08-03 | End: 2022-08-05 | Stop reason: HOSPADM

## 2022-08-03 RX ORDER — DOCUSATE SODIUM 100 MG/1
100 CAPSULE, LIQUID FILLED ORAL
Status: DISCONTINUED | OUTPATIENT
Start: 2022-08-03 | End: 2022-08-05 | Stop reason: HOSPADM

## 2022-08-03 RX ORDER — DIPHENHYDRAMINE HCL 25 MG
25 CAPSULE ORAL
Status: DISCONTINUED | OUTPATIENT
Start: 2022-08-03 | End: 2022-08-05 | Stop reason: HOSPADM

## 2022-08-03 RX ORDER — BUPIVACAINE HYDROCHLORIDE 5 MG/ML
30 INJECTION, SOLUTION EPIDURAL; INTRACAUDAL AS NEEDED
Status: DISCONTINUED | OUTPATIENT
Start: 2022-08-03 | End: 2022-08-03 | Stop reason: HOSPADM

## 2022-08-03 RX ORDER — FENTANYL/BUPIVACAINE/NS/PF 2-1250MCG
1-16 PREFILLED PUMP RESERVOIR EPIDURAL CONTINUOUS
Status: DISCONTINUED | OUTPATIENT
Start: 2022-08-03 | End: 2022-08-03 | Stop reason: HOSPADM

## 2022-08-03 RX ORDER — SIMETHICONE 80 MG
80 TABLET,CHEWABLE ORAL
Status: DISCONTINUED | OUTPATIENT
Start: 2022-08-03 | End: 2022-08-05 | Stop reason: HOSPADM

## 2022-08-03 RX ORDER — AMMONIA 15 % (W/V)
1 AMPUL (EA) INHALATION AS NEEDED
Status: DISCONTINUED | OUTPATIENT
Start: 2022-08-03 | End: 2022-08-05 | Stop reason: HOSPADM

## 2022-08-03 RX ORDER — SODIUM CHLORIDE 0.9 % (FLUSH) 0.9 %
5-40 SYRINGE (ML) INJECTION EVERY 8 HOURS
Status: DISCONTINUED | OUTPATIENT
Start: 2022-08-03 | End: 2022-08-05 | Stop reason: HOSPADM

## 2022-08-03 RX ORDER — FENTANYL CITRATE 50 UG/ML
100 INJECTION, SOLUTION INTRAMUSCULAR; INTRAVENOUS ONCE
Status: DISCONTINUED | OUTPATIENT
Start: 2022-08-03 | End: 2022-08-03 | Stop reason: HOSPADM

## 2022-08-03 RX ORDER — BUPIVACAINE HYDROCHLORIDE 5 MG/ML
INJECTION, SOLUTION EPIDURAL; INTRACAUDAL AS NEEDED
Status: DISCONTINUED | OUTPATIENT
Start: 2022-08-03 | End: 2022-08-03 | Stop reason: HOSPADM

## 2022-08-03 RX ORDER — OXYCODONE AND ACETAMINOPHEN 5; 325 MG/1; MG/1
1 TABLET ORAL
Status: DISCONTINUED | OUTPATIENT
Start: 2022-08-03 | End: 2022-08-05 | Stop reason: HOSPADM

## 2022-08-03 RX ORDER — ACETAMINOPHEN 325 MG/1
650 TABLET ORAL
Status: DISCONTINUED | OUTPATIENT
Start: 2022-08-03 | End: 2022-08-05 | Stop reason: HOSPADM

## 2022-08-03 RX ORDER — IBUPROFEN 400 MG/1
800 TABLET ORAL EVERY 8 HOURS
Status: DISCONTINUED | OUTPATIENT
Start: 2022-08-03 | End: 2022-08-05 | Stop reason: HOSPADM

## 2022-08-03 RX ORDER — FENTANYL CITRATE 50 UG/ML
INJECTION, SOLUTION INTRAMUSCULAR; INTRAVENOUS AS NEEDED
Status: DISCONTINUED | OUTPATIENT
Start: 2022-08-03 | End: 2022-08-03 | Stop reason: HOSPADM

## 2022-08-03 RX ORDER — ONDANSETRON 4 MG/1
4 TABLET, ORALLY DISINTEGRATING ORAL
Status: DISCONTINUED | OUTPATIENT
Start: 2022-08-03 | End: 2022-08-05 | Stop reason: HOSPADM

## 2022-08-03 RX ORDER — OXYTOCIN/RINGER'S LACTATE 30/500 ML
87.3 PLASTIC BAG, INJECTION (ML) INTRAVENOUS AS NEEDED
Status: DISCONTINUED | OUTPATIENT
Start: 2022-08-03 | End: 2022-08-05 | Stop reason: HOSPADM

## 2022-08-03 RX ORDER — NALOXONE HYDROCHLORIDE 0.4 MG/ML
0.4 INJECTION, SOLUTION INTRAMUSCULAR; INTRAVENOUS; SUBCUTANEOUS AS NEEDED
Status: DISCONTINUED | OUTPATIENT
Start: 2022-08-03 | End: 2022-08-03 | Stop reason: HOSPADM

## 2022-08-03 RX ORDER — LIDOCAINE HYDROCHLORIDE AND EPINEPHRINE 15; 5 MG/ML; UG/ML
INJECTION, SOLUTION EPIDURAL AS NEEDED
Status: DISCONTINUED | OUTPATIENT
Start: 2022-08-03 | End: 2022-08-03 | Stop reason: HOSPADM

## 2022-08-03 RX ORDER — LANOLIN ALCOHOL/MO/W.PET/CERES
3 CREAM (GRAM) TOPICAL
Status: DISCONTINUED | OUTPATIENT
Start: 2022-08-03 | End: 2022-08-05 | Stop reason: HOSPADM

## 2022-08-03 RX ORDER — OXYTOCIN/RINGER'S LACTATE 30/500 ML
10 PLASTIC BAG, INJECTION (ML) INTRAVENOUS AS NEEDED
Status: DISCONTINUED | OUTPATIENT
Start: 2022-08-03 | End: 2022-08-05 | Stop reason: HOSPADM

## 2022-08-03 RX ORDER — NORETHINDRONE AND ETHINYL ESTRADIOL 0.5-0.035
10 KIT ORAL ONCE
Status: COMPLETED | OUTPATIENT
Start: 2022-08-03 | End: 2022-08-03

## 2022-08-03 RX ADMIN — MAGNESIUM SULFATE HEPTAHYDRATE 2 G/HR: 500 INJECTION, SOLUTION INTRAMUSCULAR; INTRAVENOUS at 12:59

## 2022-08-03 RX ADMIN — LIDOCAINE HYDROCHLORIDE,EPINEPHRINE BITARTRATE 2 ML: 15; .005 INJECTION, SOLUTION EPIDURAL; INFILTRATION; INTRACAUDAL; PERINEURAL at 11:19

## 2022-08-03 RX ADMIN — FENTANYL CITRATE 50 MCG: 50 INJECTION, SOLUTION INTRAMUSCULAR; INTRAVENOUS at 11:22

## 2022-08-03 RX ADMIN — ACETAMINOPHEN 650 MG: 325 TABLET ORAL at 10:39

## 2022-08-03 RX ADMIN — MAGNESIUM SULFATE HEPTAHYDRATE 2 G/HR: 500 INJECTION, SOLUTION INTRAMUSCULAR; INTRAVENOUS at 02:45

## 2022-08-03 RX ADMIN — SODIUM CHLORIDE, POTASSIUM CHLORIDE, SODIUM LACTATE AND CALCIUM CHLORIDE 75 ML/HR: 600; 310; 30; 20 INJECTION, SOLUTION INTRAVENOUS at 10:02

## 2022-08-03 RX ADMIN — SODIUM CHLORIDE, POTASSIUM CHLORIDE, SODIUM LACTATE AND CALCIUM CHLORIDE 75 ML/HR: 600; 310; 30; 20 INJECTION, SOLUTION INTRAVENOUS at 14:26

## 2022-08-03 RX ADMIN — PENICILLIN G POTASSIUM 2.5 MILLION UNITS: 20000000 INJECTION, POWDER, FOR SOLUTION INTRAVENOUS at 07:08

## 2022-08-03 RX ADMIN — Medication 10 ML/HR: at 11:26

## 2022-08-03 RX ADMIN — EPHEDRINE SULFATE 10 MG: 50 INJECTION INTRAVENOUS at 11:34

## 2022-08-03 RX ADMIN — MAGNESIUM SULFATE HEPTAHYDRATE 2 G/HR: 500 INJECTION, SOLUTION INTRAMUSCULAR; INTRAVENOUS at 07:48

## 2022-08-03 RX ADMIN — MAGNESIUM SULFATE HEPTAHYDRATE 2 G/HR: 500 INJECTION, SOLUTION INTRAMUSCULAR; INTRAVENOUS at 19:05

## 2022-08-03 RX ADMIN — LABETALOL HYDROCHLORIDE 200 MG: 200 TABLET, FILM COATED ORAL at 06:32

## 2022-08-03 RX ADMIN — BUPIVACAINE HYDROCHLORIDE 1 ML: 5 INJECTION, SOLUTION EPIDURAL; INTRACAUDAL; PERINEURAL at 11:19

## 2022-08-03 RX ADMIN — LABETALOL HYDROCHLORIDE 200 MG: 200 TABLET, FILM COATED ORAL at 20:57

## 2022-08-03 RX ADMIN — ACETAMINOPHEN 650 MG: 325 TABLET ORAL at 04:23

## 2022-08-03 RX ADMIN — PENICILLIN G POTASSIUM 2.5 MILLION UNITS: 20000000 INJECTION, POWDER, FOR SOLUTION INTRAVENOUS at 01:54

## 2022-08-03 RX ADMIN — BUPIVACAINE HYDROCHLORIDE 2 ML: 5 INJECTION, SOLUTION EPIDURAL; INTRACAUDAL; PERINEURAL at 11:20

## 2022-08-03 RX ADMIN — PENICILLIN G POTASSIUM 2.5 MILLION UNITS: 20000000 INJECTION, POWDER, FOR SOLUTION INTRAVENOUS at 11:31

## 2022-08-03 RX ADMIN — OXYTOCIN 10000 MILLI-UNITS: 10 INJECTION, SOLUTION INTRAMUSCULAR; INTRAVENOUS at 12:15

## 2022-08-03 RX ADMIN — FENTANYL CITRATE 50 MCG: 50 INJECTION, SOLUTION INTRAMUSCULAR; INTRAVENOUS at 11:21

## 2022-08-03 RX ADMIN — LIDOCAINE HYDROCHLORIDE,EPINEPHRINE BITARTRATE 2 ML: 15; .005 INJECTION, SOLUTION EPIDURAL; INFILTRATION; INTRACAUDAL; PERINEURAL at 11:18

## 2022-08-03 RX ADMIN — BETAMETHASONE SODIUM PHOSPHATE AND BETAMETHASONE ACETATE 12 MG: 3; 3 INJECTION, SUSPENSION INTRA-ARTICULAR; INTRALESIONAL; INTRAMUSCULAR at 04:03

## 2022-08-03 NOTE — PROGRESS NOTES
TRANSFER - IN REPORT:    Verbal report received from Consuelo Brantley (name) on [address]  being received from L&D (unit) for routine progression of care      Report consisted of patients Situation, Background, Assessment and   Recommendations(SBAR). Information from the following report(s) SBAR, Kardex, Procedure Summary, Intake/Output, and MAR was reviewed with the receiving nurse. Opportunity for questions and clarification was provided. Assessment completed upon patients arrival to unit and care assumed.

## 2022-08-03 NOTE — ANESTHESIA PROCEDURE NOTES
Epidural Block    Patient location during procedure: OB  Start time: 8/3/2022 11:10 AM  End time: 8/3/2022 11:22 AM  Reason for block: labor epidural  Staffing  Performed: attending   Anesthesiologist: Carlota Roque MD  Preanesthetic Checklist  Completed: patient identified, IV checked, site marked, risks and benefits discussed, surgical consent, monitors and equipment checked, pre-op evaluation and timeout performed  Block Placement  Patient position: left lateral decubitus  Prep: ChloraPrep  Sterility prep: cap, drape, gloves and mask  Sedation level: no sedation  Patient monitoring: continuous pulse oximetry and heart rate  Approach: midline  Location: lumbar  Lumbar location: L3-L4  Epidural  Loss of resistance technique: air  Guidance: landmark technique  Needle  Needle type: Tuohy   Needle gauge: 17 G  Needle length: 9 cm  Needle insertion depth: 7 cm  Catheter type: end hole  Catheter size: 19 G  Catheter at skin depth: 12 cm  Catheter securement method: clear occlusive dressing and surgical tape  Test dose: negative  Assessment  Sensory level: T10  Block outcome: pain improved  Number of attempts: 1  Procedure assessment: patient tolerated procedure well with no immediate complications

## 2022-08-03 NOTE — L&D DELIVERY NOTE
Delivery Summary  Patient: Yaritza Baker             Circumcision:   desires  Additional Delivery Comments - Precipitous delivery after IOL for pre-eclampsia w/SF and GBS unknown status on antibiotics. Pt progressed rapidly to complete and delivered over an intact perineum. A double nuchal was present that was reduced without difficulty. Baby was placed onto maternal abdomen and a cry was noted. Cord was clamped after 1 minute. Baby was handed off to NICU team.  Placenta was delivered with gentle traction and pitocin was started. Perineum was inspected and no lacerations were noted.   Mg to continue for 24hrs PP    Information for the patient's :  Lauraforrest Suazo [[id number]]     Delivery Type:     Delivery Date: 8/3/2022   Delivery Time: 12:09 PM     Birth Weight:       Sex:  male  Delivery Clinician:    Raffi Serrano  Gestational Age: 43w3d    Presentation:     Position:             Apgars were   and       Resuscitation Method:       Meconium Stained:      Living Status:         Placenta Date/Time:     Placenta Removal:     Placenta Appearance:      Cord Information:      Cord Events:         Disposition of Cord Blood:      Blood Gases Sent?:        Cord pH:  none,     Episiotomy:    Laceration(s):      Estimated Blood Loss (ml): No data found 250 mL EBL    Labor Events  Method: Oxytocin      Augmentation:    Cervical Ripening:     None        Operative Vaginal Delivery - none

## 2022-08-03 NOTE — ANESTHESIA POSTPROCEDURE EVALUATION
Post-Anesthesia Evaluation and Assessment    Patient: Henok Weir MRN: [id number]  SSN: [id number]    YOB: 1985  Age: 42 y.o. Sex: female      I have evaluated the patient and they are stable and ready for discharge from the PACU. Cardiovascular Function/Vital Signs  Visit Vitals  /78   Pulse 77   Temp 36.4 °C (97.6 °F)   Resp 16   Ht 5' 3\" (1.6 m)   Wt 88.5 kg (195 lb)   SpO2 99%   Breastfeeding Unknown   BMI 34.54 kg/m²       Patient is status post * No anesthesia type entered * anesthesia for * No procedures listed *. Nausea/Vomiting: None    Postoperative hydration reviewed and adequate. Pain:  Pain Scale 1: Numeric (0 - 10) (08/03/22 1215)  Pain Intensity 1: 0 (08/03/22 1215)   Managed    Neurological Status:   Neuro (WDL): Within Defined Limits (08/03/22 1215)   At baseline    Mental Status, Level of Consciousness: Alert and  oriented to person, place, and time    Pulmonary Status:   O2 Device: None (Room air) (08/03/22 1215)   Adequate oxygenation and airway patent    Complications related to anesthesia: None    Post-anesthesia assessment completed. No concerns    Signed By: Duyen Villegas MD     August 3, 2022              * No procedures listed *.    epidural    <BSHSIANPOST>    INITIAL Post-op Vital signs: No vitals data found for the desired time range.

## 2022-08-03 NOTE — H&P
History & Physical    Name: Fabiana Dillon MRN: [id number]  SSN: [id number]    YOB: 1985  Age: 42 y.o. Sex: female      Subjective:     Estimated Date of Delivery: 22  OB History    Para Term  AB Living   3 1   1   1   SAB IAB Ectopic Molar Multiple Live Births             1      # Outcome Date GA Lbr Jose/2nd Weight Sex Delivery Anes PTL Lv   3 Current            2  / 36w4d 02:05  02:23 2.815 kg M Hornell Batman AN N HANDY   1                 Ms. Carrie Auguste is admitted with pregnancy at 36w2d for r/o severe pre eclampsia. She was seen in the office today for weekly surveillance given recently diagnosis of pre eclampsia w/out SF and was found to have an IUGR baby (3rd%ile, 2pm08dv) and severe range BPs. She denies HAs, visual changes or RUQ discomfort. She notes active FM. 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 Mckeon Poster)    Please see prenatal records for details.     Past Medical History:   Diagnosis Date    Abnormal Papanicolaou smear of cervix     HPV    Asthma     Asthmatic Bronchitis,     Bronchitis     Asthmatic Bronchitis    Fatigue     Hyperthyroidism 2017    nodule outside of thyroid, being watched    Infertility, female      pregnancy IUI    Knee pain, right     Thyroid nodule     Nodule outside of thyroid, being watched     Past Surgical History:   Procedure Laterality Date    HX OTHER SURGICAL      R knee surgery    HX OTHER SURGICAL  2010    Ovarian cyst     Social History     Occupational History    Not on file   Tobacco Use    Smoking status: Never    Smokeless tobacco: Never   Vaping Use    Vaping Use: Not on file   Substance and Sexual Activity    Alcohol use: No    Drug use: No    Sexual activity: Yes     Partners: Male     Birth control/protection: None Family History   Problem Relation Age of Onset    Thyroid Disease Mother     Hypertension Father     No Known Problems Brother     No Known Problems Brother        No Known Allergies  Prior to Admission medications    Medication Sig Start Date End Date Taking? Authorizing Provider   [id number] faiza-folic-dha -367 mg-mcg-mg cmpk Take  by mouth. Yes Provider, Historical   albuterol (PROVENTIL HFA, VENTOLIN HFA, PROAIR HFA) 90 mcg/actuation inhaler Take 1 Puff by inhalation every four (4) hours as needed for Wheezing. 9/11/17   Denisa Shaffer MD   albuterol (PROVENTIL HFA, VENTOLIN HFA, PROAIR HFA) 90 mcg/actuation inhaler Take 2 Puffs by inhalation every four (4) hours as needed for Wheezing. 1/19/16   Daniel Dotson MD        Review of Systems  Negative other than noted in HPI    Objective:     Vitals:  Vitals:    08/02/22 1559 08/02/22 1629 08/02/22 1630 08/02/22 1702   BP: (!) 145/83 (!) 141/86 (!) 160/95 (!) 155/89   Pulse: 80 78 76 76   Resp:       Temp:       SpO2:       Weight:       Height:            Physical Exam      Gen: NAD  Resp: CTAB  CV: RRR  Abd: soft, NT, ND, gravid  Ext: no edema    Cervix (office today): 6CB/38%/-2, cephalic    Patient Vitals for the past 4 hrs:    Mode Fetal Heart Rate Variability Decelerations Accelerations RN Reviewed Strip?   08/02/22 1702 External 130 6-25 BPM None Yes Yes     Category 1 fetal heart tracing    [place]: rare contraction    Prenatal Labs:    Lab Results   Component Value Date/Time    Rubella, External 12.10 Immune 09/08/2016 12:00 AM    GrBStrep, External Positive - Urine 09/08/2016 12:00 AM    HBsAg, External Negative 09/08/2016 12:00 AM    HIV, External Non Reactive 09/08/2016 12:00 AM    Gonorrhea, External Negative 09/08/2016 12:00 AM    Chlamydia, External Negative 09/08/2016 12:00 AM    ABO,Rh O Positive 09/08/2016 12:00 AM        Recent Results (from the past 12 hour(s))   CBC W/O DIFF    Collection Time: 08/02/22  2:08 PM   Result Value Ref Range    WBC 7.5 3.6 - 11.0 K/uL    RBC 4.17 3.80 - 5.20 M/uL    HGB 12.2 11.5 - 16.0 g/dL    HCT 36.3 35.0 - 47.0 %    MCV 87.1 80.0 - 99.0 FL    MCH 29.3 26.0 - 34.0 PG    MCHC 33.6 30.0 - 36.5 g/dL    RDW 14.6 (H) 11.5 - 14.5 %    PLATELET 729 903 - 250 K/uL    MPV 12.0 8.9 - 12.9 FL    NRBC 0.0 0  WBC    ABSOLUTE NRBC 0.00 0.00 - 9.32 K/uL   METABOLIC PANEL, COMPREHENSIVE    Collection Time: 08/02/22  2:08 PM   Result Value Ref Range    Sodium 135 (L) 136 - 145 mmol/L    Potassium 4.1 3.5 - 5.1 mmol/L    Chloride 104 97 - 108 mmol/L    CO2 23 21 - 32 mmol/L    Anion gap 8 5 - 15 mmol/L    Glucose 85 65 - 100 mg/dL    BUN 20 6 - 20 MG/DL    Creatinine 0.65 0.55 - 1.02 MG/DL    BUN/Creatinine ratio 31 (H) 12 - 20      GFR est AA >60 >60 ml/min/1.73m2    GFR est non-AA >60 >60 ml/min/1.73m2    Calcium 9.1 8.5 - 10.1 MG/DL    Bilirubin, total 0.2 0.2 - 1.0 MG/DL    ALT (SGPT) 19 12 - 78 U/L    AST (SGOT) 15 15 - 37 U/L    Alk. phosphatase 129 (H) 45 - 117 U/L    Protein, total 5.7 (L) 6.4 - 8.2 g/dL    Albumin 2.3 (L) 3.5 - 5.0 g/dL    Globulin 3.4 2.0 - 4.0 g/dL    A-G Ratio 0.7 (L) 1.1 - 2.2     PROTEIN/CREATININE RATIO, URINE    Collection Time: 08/02/22  2:08 PM   Result Value Ref Range    Protein, urine random 148 (H) 0.0 - 11.9 mg/dL    Creatinine, urine 52.00 mg/dL    Protein/Creat. urine Ratio 2.8     URIC ACID    Collection Time: 08/02/22  2:08 PM   Result Value Ref Range    Uric acid 4.5 2.6 - 6.0 MG/DL   LD    Collection Time: 08/02/22  2:08 PM   Result Value Ref Range     81 - 246 U/L         Impression/Plan:     Active Problems:    Pre-eclampsia (8/2/2022)      IUGR (intrauterine growth restriction) affecting care of mother (8/2/2022)     [id number] 39 2/7 with pre eclampsia with severe features (based on multiple severe range BPs here and ur pr/cr ratio 2.8 and newly found IUGR 3rd%ile. Plan:     Given Pre eclampsia w/ severe features will proceed with induction of labor.    The diagnosis was discussed with the patient at length and we reviewed the recommendation to proceed with IOL with which she agrees. BMZ x 1 given, will plan to give BMZ #2 in12 hours though this is not to delay delivery should mother be ready to give birth prior (this was reviewed by phone with Dr. Jessica Wilson)  GBS is unknown, given premature, will treat presumptively with PCN. GBS was collected and is pending. Will start magnesium given Pre E w/SF. Will start labetalol protocol for severe range BPs then will transition to po labetalol once under control. Will monitor BPs closely. Start IOL with pitocin. CEFM. T&S sent.     Arturo Ellis MD

## 2022-08-03 NOTE — ANESTHESIA PREPROCEDURE EVALUATION
Relevant Problems   RESPIRATORY SYSTEM   (+) Asthma      CARDIOVASCULAR   (+) Pre-eclampsia      ENDOCRINE   (+) Hyperthyroidism       Anesthetic History   No history of anesthetic complications            Review of Systems / Medical History  Patient summary reviewed, nursing notes reviewed and pertinent labs reviewed    Pulmonary  Within defined limits          Asthma        Neuro/Psych   Within defined limits           Cardiovascular  Within defined limits                Exercise tolerance: >4 METS     GI/Hepatic/Renal  Within defined limits              Endo/Other  Within defined limits    Hypothyroidism       Other Findings   Comments: TIUP           Physical Exam    Airway  Mallampati: II  TM Distance: > 6 cm  Neck ROM: normal range of motion   Mouth opening: Normal     Cardiovascular  Regular rate and rhythm,  S1 and S2 normal,  no murmur, click, rub, or gallop             Dental  No notable dental hx       Pulmonary  Breath sounds clear to auscultation               Abdominal  GI exam deferred       Other Findings            Anesthetic Plan    ASA: 2  Anesthesia type: epidural      Post-op pain plan if not by surgeon: indwelling epidural catheter    Induction: Intravenous  Anesthetic plan and risks discussed with: Patient and Spouse

## 2022-08-03 NOTE — PROGRESS NOTES
6718 Bedside and Verbal shift change report given to MADAN Rojas RN  (oncoming nurse) by ELZA Tafoya RN (offgoing nurse). Report included the following information SBAR, Kardex, Procedure Summary, Intake/Output, MAR, and Recent Results. [id number] Charlene Melendrez at bedside placing epidural.    1145 Dr. Michelet Juan at bedside. Reviewed fetal monitor strip. SVE     1200 Patient has c/o intermittent rectal pressure    1203 Patient has c/o constant pressure    1206 Patient states she feels like \"Head is coming out\". Peanut ball removed - head visible. Dr. Bere Skelton called    91 21 06 Dr. Bere Skelton at bedside. 1  of liveborn male by Dr. Bere Skelton    1430 TRANSFER - OUT REPORT:    Verbal report given to Jacob Harding RN (name) on [facility]  being transferred to [facility]) for routine progression of care       Report consisted of patients Situation, Background, Assessment and   Recommendations(SBAR). Information from the following report(s) SBAR, Procedure Summary, Intake/Output, MAR, and Recent Results was reviewed with the receiving nurse. Lines:   Peripheral IV 22 Distal;Left;Posterior Forearm (Active)   Site Assessment Clean, dry, & intact 22 0802   Phlebitis Assessment 0 22 0802   Infiltration Assessment 0 22 0802   Dressing Status Clean, dry, & intact 22 0802   Dressing Type Tape;Transparent 22 0802   Hub Color/Line Status Green; Infusing 22 0802        Opportunity for questions and clarification was provided.       Patient transported with:   Registered Nurse

## 2022-08-04 PROCEDURE — 74011250637 HC RX REV CODE- 250/637: Performed by: OBSTETRICS & GYNECOLOGY

## 2022-08-04 PROCEDURE — 74011000258 HC RX REV CODE- 258: Performed by: OBSTETRICS & GYNECOLOGY

## 2022-08-04 PROCEDURE — 65410000002 HC RM PRIVATE OB

## 2022-08-04 PROCEDURE — 74011250636 HC RX REV CODE- 250/636: Performed by: OBSTETRICS & GYNECOLOGY

## 2022-08-04 RX ADMIN — MAGNESIUM SULFATE HEPTAHYDRATE 2 G/HR: 500 INJECTION, SOLUTION INTRAMUSCULAR; INTRAVENOUS at 00:08

## 2022-08-04 RX ADMIN — LABETALOL HYDROCHLORIDE 200 MG: 200 TABLET, FILM COATED ORAL at 20:36

## 2022-08-04 RX ADMIN — MAGNESIUM SULFATE HEPTAHYDRATE 2 G/HR: 500 INJECTION, SOLUTION INTRAMUSCULAR; INTRAVENOUS at 05:03

## 2022-08-04 RX ADMIN — MAGNESIUM SULFATE HEPTAHYDRATE 2 G/HR: 500 INJECTION, SOLUTION INTRAMUSCULAR; INTRAVENOUS at 11:17

## 2022-08-04 RX ADMIN — LABETALOL HYDROCHLORIDE 200 MG: 200 TABLET, FILM COATED ORAL at 09:12

## 2022-08-04 NOTE — PROGRESS NOTES
Pt blood pressure was 153/91 pt denies any headaches dizziness,nausea , other vital signs are stable notified Dr. Kennedy Fearing no new orders given md will be over to see pt

## 2022-08-04 NOTE — PROGRESS NOTES
Bedside and Verbal shift change report given to TSERING Lincoln RN/TSERING Wisdom RN (oncoming nurse) by Mikayla Castillo RN (offgoing nurse). Report included the following information SBAR, Kardex, Intake/Output, MAR, and Recent Results.

## 2022-08-04 NOTE — PROGRESS NOTES
Post-Partum Day Number 1 Progress Note    Fred Gonzalez     Assessment: Doing well, post partum day 1    Plan:  - Continue routine postpartum and perineal care as well as maternal education.  - baby in NICU - desires circ when possible  - Plan discharge home [facility] Discharge Date: Tomorrow (pending BPs). - preE w severe features - s/p mag, asymptomatic  - on labetalol 200mg BID  - BPs normal/mild range    Information for the patient's :  Jamal Restrepo [[id number]]   Vaginal, Spontaneous  Patient doing well without significant complaint. Voiding without difficulty, normal lochia. Vitals:  Visit Vitals  BP (!) 145/86   Pulse 79   Temp 98.2 °F (36.8 °C)   Resp 16   Ht 5' 3\" (1.6 m)   Wt 88.5 kg (195 lb)   SpO2 94%   Breastfeeding Unknown   BMI 34.54 kg/m²     Temp (24hrs), Av.7 °F (36.5 °C), Min:97.5 °F (36.4 °C), Max:98.2 °F (36.8 °C)        Exam:   Patient without distress. Fundus firm, nontender                 Perineum with normal lochia noted per nursing assessment. Lower extremities are negative for pathological edema. Labs:     Lab Results   Component Value Date/Time    WBC 7.5 2022 02:08 PM    WBC 13.2 (H) 2017 01:46 AM    WBC 7.4 2012 11:40 AM    HGB 12.2 2022 02:08 PM    HGB 12.8 2017 01:46 AM    HGB 11.7 2012 11:40 AM    HCT 36.3 2022 02:08 PM    HCT 37.8 2017 01:46 AM    HCT 35.4 2012 11:40 AM    PLATELET 562  02:08 PM    PLATELET 353  01:46 AM    PLATELET 383  11:40 AM       No results found for this or any previous visit (from the past 24 hour(s)).

## 2022-08-04 NOTE — LACTATION NOTE
Infant born yesterday to a  mom at 39 3/7 weeks gestation. Infant transferred to NICU for low temp and low blood sugars. Pt will successfully establish breast milk supply by pumping with a hospital grade pump every 2-3 hours for approximately 20 minutes/8-10 x day with the correct size flange, and suction level for mother's comfort. To maximize milk production, mom taught to incorporate breast massage and hand expression into pumping sessions. All expressed breast milk (EBM) will be provided for infant use, in clean bottles/syringes for storage in NICU breastmilk refrigerator. Patient label with barcode,date and time applied to each container prior to transport to NICU. Proper cleaning of pump parts and good hand hygiene discussed. Mother is advised to rent a hospital grade pump to continue regimen at home. Progress of milk transition, pumping log, expected EBM volumes, care of engorged breasts discussed. The value of bonding with baby emphasized, strategies for participating in infant care, photos, footprints, touch, and holding skin to skin as soon as baby and mother are able have been shown to increase oxytocin levels. The breast will be offered as baby is ready; with the goal of eventual transition to breastfeeding.

## 2022-08-05 VITALS
HEART RATE: 72 BPM | TEMPERATURE: 98.6 F | SYSTOLIC BLOOD PRESSURE: 131 MMHG | OXYGEN SATURATION: 98 % | DIASTOLIC BLOOD PRESSURE: 77 MMHG | BODY MASS INDEX: 34.55 KG/M2 | RESPIRATION RATE: 16 BRPM | HEIGHT: 63 IN | WEIGHT: 195 LBS

## 2022-08-05 LAB
BACTERIA SPEC CULT: NORMAL
SERVICE CMNT-IMP: NORMAL

## 2022-08-05 PROCEDURE — 74011250637 HC RX REV CODE- 250/637: Performed by: OBSTETRICS & GYNECOLOGY

## 2022-08-05 RX ORDER — IBUPROFEN 800 MG/1
800 TABLET ORAL
Qty: 40 TABLET | Refills: 1 | Status: SHIPPED | OUTPATIENT
Start: 2022-08-05

## 2022-08-05 RX ORDER — LABETALOL 200 MG/1
200 TABLET, FILM COATED ORAL 2 TIMES DAILY
Qty: 60 TABLET | Refills: 1 | Status: SHIPPED | OUTPATIENT
Start: 2022-08-05

## 2022-08-05 RX ADMIN — LABETALOL HYDROCHLORIDE 200 MG: 200 TABLET, FILM COATED ORAL at 08:21

## 2022-08-05 NOTE — LACTATION NOTE
Patient states she continues to pump for baby in the NICU. She is collecting her breast milk and bringing it to the NICU for the baby. Mom will call out for assistance as needed when she is attempting to breast feed.

## 2022-08-05 NOTE — PROGRESS NOTES
Post-Partum Day Number 2 Progress Note    Fred Gonzalez     Assessment:   Doing well, post partum day 1  PreE w severe features - s/p mag, asymptomatic, BPs good on labetalol 200mg BID  Boy in NICU (will want circ when able to get)    Plan:  - Continue routine postpartum and perineal care as well as maternal education.  - N/A   - Plan discharge home [address] Discharge Date: Today. Information for the patient's :  Raymond Kohli [[id number]]   Vaginal, Spontaneous  Patient doing well without significant complaint. Voiding without difficulty, normal lochia. Vitals:  Visit Vitals  /77 (BP 1 Location: Left upper arm, BP Patient Position: At rest)   Pulse 72   Temp 98.6 °F (37 °C)   Resp 16   Ht 5' 3\" (1.6 m)   Wt 88.5 kg (195 lb)   SpO2 98%   Breastfeeding Unknown   BMI 34.54 kg/m²     Temp (24hrs), Av.3 °F (36.8 °C), Min:98 °F (36.7 °C), Max:98.6 °F (37 °C)        Exam:   Patient without distress. Fundus firm, nontender per nursing fundal checks. Perineum with normal lochia noted per nursing assessment. Lower extremities are negative for pathological edema. Labs:     Lab Results   Component Value Date/Time    WBC 7.5 2022 02:08 PM    WBC 13.2 (H) 2017 01:46 AM    WBC 7.4 2012 11:40 AM    HGB 12.2 2022 02:08 PM    HGB 12.8 2017 01:46 AM    HGB 11.7 2012 11:40 AM    HCT 36.3 2022 02:08 PM    HCT 37.8 2017 01:46 AM    HCT 35.4 2012 11:40 AM    PLATELET 830  02:08 PM    PLATELET 182  01:46 AM    PLATELET 200  11:40 AM       No results found for this or any previous visit (from the past 24 hour(s)).

## 2022-08-05 NOTE — DISCHARGE INSTRUCTIONS
POST DELIVERY DISCHARGE INSTRUCTIONS    Name: Rubi Lopez  YOB: 1985  Primary Diagnosis: [unfilled]    General:     Diet/Diet Restrictions:  Eight 8-ounce glasses of fluid daily (water, juices); avoid excessive caffeine intake. Meals/snacks as desired which are high in fiber and carbohydrates and low in fat and cholesterol. Physical Activity / Restrictions / Safety:     Avoid heavy lifting, no more that 8 lbs. For 2-3 weeks;   Limit use of stairs to 2 times daily for the first week home. No driving for one week. Avoid intercourse 4-6 weeks, no douching or tampon use. Check with obstetrician before starting or resuming an exercise program.      Discharge Instructions/Special Treatment/Home Care Needs:     Continue prenatal vitamins. Continue to use squirt bottle with warm water on your episiotomy after each bathroom use until bleeding stops. If steri-strips applied to your incision, remove in 7-10 days. Call your doctor for the following:     Fever over 101 degrees by mouth. Vaginal bleeding heavier than a normal menstrual period or clots larger than a golf ball. Red streaks or increased swelling of legs, painful red streaks on your breast.  Painful urination, constipation and increased pain or swelling or discharge with your incision. If you feel extremely anxious or overwhelmed. If you have thoughts of harming yourself and/or your baby. Pain Management:     Take Acetaminophen (Tylenol) or Ibuprofen (Advil, Motrin), as directed for pain. Use a warm Sitz bath 3 times daily to relieve episiotomy or hemorrhoidal discomfort. For hemorrhoidal discomfort, use Tucks and Anusol cream as needed and directed. Heating pad to  incision as needed.      Follow-Up Care:     Appointment with MD: [unfilled]  Telephone number: [phone]    Signed By: Natalya Lynn MD                                                                                                   Date: 8/5/2022 Time: 10:33 AM

## 2022-08-05 NOTE — PROGRESS NOTES
Spiritual Care Assessment/Progress Note  [address]      NAME: Melanie Walter      MRN: [id number]  AGE: 42 y.o. SEX: female  Judaism Affiliation: No Yazdanism   Language: English     8/5/2022     Total Time (in minutes): 15     Spiritual Assessment begun in [address] through conversation with:         []Patient        [] Family    [] Friend(s)        Reason for Consult: Initial/Spiritual assessment, critical care     Spiritual beliefs: (Please include comment if needed)     [] Identifies with a lizette tradition:         [] Supported by a lizette community:            [] Claims no spiritual orientation:           [] Seeking spiritual identity:                [] Adheres to an individual form of spirituality:           [x] Not able to assess:                           Identified resources for coping:      [] Prayer                               [] Music                  [] Guided Imagery     [x] Family/friends                 [] Pet visits     [] Devotional reading                         [] Unknown     [] Other:                                               Interventions offered during this visit: (See comments for more details)    Patient Interventions: Initial/Spiritual assessment, patient floor           Plan of Care:     [] Support spiritual and/or cultural needs    [] Support AMD and/or advance care planning process      [] Support grieving process   [] Coordinate Rites and/or Rituals    [] Coordination with community clergy   [] No spiritual needs identified at this time   [] Detailed Plan of Care below (See Comments)  [] Make referral to Music Therapy  [] Make referral to Pet Therapy     [] Make referral to Addiction services  [] Make referral to [facility]  [] Make referral to Spiritual Care Partner  [] No future visits requested        [x] Contact Spiritual Care for further referrals     Comments: [address] made initial visit to patients room on [facility].  Patient was not available for a visit at this time.  was visiting as the patient has a baby in the NICU and wanted to check in with the mom.  was unable to access the patient at this time. Radha Joel will continue to follow up with the family while their baby is in the NICU. Rev.  Mary Lawrence MDiv  NICU Staff   I:[phone]  [address] [place] [address] Drive  [email or web address]

## 2022-08-05 NOTE — LACTATION NOTE
This note was copied from a baby's chart. Baby nursing well today,  deep latch obtained, mother is comfortable, baby feeding vigorously with rhythmic suck, swallow, breathe pattern, audible swallowing, and evident milk transfer, baby is asleep following feeding. Mother followed up with EBM via Dr Venice ruelas. Mother is pumping and collecting full milk. She is familiar with progression of lactogenesis and is pleased with her progress.

## 2022-08-05 NOTE — DISCHARGE SUMMARY
Obstetrical Discharge Summary     Name: Arleth Tran MRN: [id number]  SSN: [id number]    YOB: 1985  Age: 42 y.o. Sex: female      Admit Date: 2022    Discharge Date: 2022     Admitting Physician: Nai Green MD     Attending Physician:  Angie Acosta MD     Admission Diagnoses: Pre-eclampsia [O14.90]  IUGR (intrauterine growth restriction) affecting care of mother [O36.5990]    Discharge Diagnoses:   Information for the patient's :  Reginald Bernardo [[id number]]   Delivery of a 2.09 kg male infant via Vaginal, Spontaneous on 8/3/2022 at 12:09 PM  by Magali Click. Apgars were 9  and 9 . Additional Diagnoses:   Hospital Problems  Date Reviewed: 8/3/2022            Codes Class Noted POA    Pre-eclampsia ICD-10-CM: O14.90  ICD-9-CM: 642.40  2022 Unknown        IUGR (intrauterine growth restriction) affecting care of mother ICD-10-CM: O36.5990  ICD-9-CM: 656.50  2022 Unknown        Normal labor and delivery ICD-10-CM: O80  ICD-9-CM: 869  3/23/2017 Unknown          Lab Results   Component Value Date/Time    Rubella, External 11.50 2022 12:00 AM    GrBStrep, External Positive - Urine 2016 12:00 AM       Hospital Course: The patient was diagnosed with pre eclampsia with severe features on admission and received 24 hours of magnesium postpartum and was started on labetalol 200mg po bid for BP management. She will follow up in 1 wk for a BP check. Baby in NICU for size and prematurity. Patient Instructions:   Current Discharge Medication List        START taking these medications    Details   ibuprofen (MOTRIN) 800 mg tablet Take 1 Tablet by mouth every eight (8) hours as needed for Pain. Qty: 40 Tablet, Refills: 1      labetaloL (NORMODYNE) 200 mg tablet Take 1 Tablet by mouth two (2) times a day.   Qty: 60 Tablet, Refills: 1           CONTINUE these medications which have NOT CHANGED    Details   [id number] faiza-folic-dha -124 mg-mcg-mg cmpk Take  by mouth. albuterol (PROVENTIL HFA, VENTOLIN HFA, PROAIR HFA) 90 mcg/actuation inhaler Take 2 Puffs by inhalation every four (4) hours as needed for Wheezing. Qty: 1 Inhaler, Refills: 3             Disposition at Discharge: Home or self care    Condition at Discharge: Stable    Reference my discharge instructions. Follow-up Appointments   Procedures    FOLLOW UP VISIT Appointment in: One Week     Standing Status:   Standing     Number of Occurrences:   1     Order Specific Question:   Appointment in     Answer: One Week        Signed By:  Erik Bhatt MD     August 5, 2022                      .

## 2023-05-19 RX ORDER — IBUPROFEN 800 MG/1
800 TABLET ORAL EVERY 8 HOURS PRN
COMMUNITY
Start: 2022-08-05

## 2023-05-19 RX ORDER — ALBUTEROL SULFATE 90 UG/1
2 AEROSOL, METERED RESPIRATORY (INHALATION) EVERY 4 HOURS PRN
COMMUNITY
Start: 2016-01-19

## 2023-07-14 ENCOUNTER — HOSPITAL ENCOUNTER (OUTPATIENT)
Facility: HOSPITAL | Age: 38
Discharge: HOME | End: 2023-07-14
Attending: FAMILY MEDICINE
Payer: COMMERCIAL

## 2023-07-14 DIAGNOSIS — E04.1 THYROID NODULE: ICD-10-CM

## 2023-07-14 PROCEDURE — 76536 US EXAM OF HEAD AND NECK: CPT
